# Patient Record
Sex: MALE | Race: WHITE | NOT HISPANIC OR LATINO | Employment: FULL TIME | URBAN - NONMETROPOLITAN AREA
[De-identification: names, ages, dates, MRNs, and addresses within clinical notes are randomized per-mention and may not be internally consistent; named-entity substitution may affect disease eponyms.]

---

## 2020-11-04 ENCOUNTER — HOSPITAL ENCOUNTER (INPATIENT)
Facility: HOSPITAL | Age: 40
LOS: 1 days | Discharge: ANOTHER HOSPITAL | End: 2020-11-05
Attending: INTERNAL MEDICINE | Admitting: INTERNAL MEDICINE

## 2020-11-04 DIAGNOSIS — I21.09 ACUTE ANTERIOR WALL MI (HCC): ICD-10-CM

## 2020-11-04 DIAGNOSIS — J96.01 ACUTE RESPIRATORY FAILURE WITH HYPOXIA (HCC): Primary | Chronic | ICD-10-CM

## 2020-11-04 DIAGNOSIS — R57.0 CARDIOGENIC SHOCK (HCC): ICD-10-CM

## 2020-11-04 PROCEDURE — C1894 INTRO/SHEATH, NON-LASER: HCPCS | Performed by: INTERNAL MEDICINE

## 2020-11-04 PROCEDURE — C1769 GUIDE WIRE: HCPCS | Performed by: INTERNAL MEDICINE

## 2020-11-04 PROCEDURE — 4A023N7 MEASUREMENT OF CARDIAC SAMPLING AND PRESSURE, LEFT HEART, PERCUTANEOUS APPROACH: ICD-10-PCS | Performed by: INTERNAL MEDICINE

## 2020-11-04 PROCEDURE — 0 IOPAMIDOL PER 1 ML: Performed by: INTERNAL MEDICINE

## 2020-11-04 PROCEDURE — 027135Z DILATION OF CORONARY ARTERY, TWO ARTERIES WITH TWO DRUG-ELUTING INTRALUMINAL DEVICES, PERCUTANEOUS APPROACH: ICD-10-PCS | Performed by: INTERNAL MEDICINE

## 2020-11-04 PROCEDURE — C1725 CATH, TRANSLUMIN NON-LASER: HCPCS | Performed by: INTERNAL MEDICINE

## 2020-11-04 PROCEDURE — 80053 COMPREHEN METABOLIC PANEL: CPT | Performed by: INTERNAL MEDICINE

## 2020-11-04 PROCEDURE — B41F1ZZ FLUOROSCOPY OF RIGHT LOWER EXTREMITY ARTERIES USING LOW OSMOLAR CONTRAST: ICD-10-PCS | Performed by: INTERNAL MEDICINE

## 2020-11-04 PROCEDURE — C1887 CATHETER, GUIDING: HCPCS | Performed by: INTERNAL MEDICINE

## 2020-11-04 PROCEDURE — 25010000002 PHENYLEPHRINE PER 1 ML: Performed by: INTERNAL MEDICINE

## 2020-11-04 PROCEDURE — 84484 ASSAY OF TROPONIN QUANT: CPT | Performed by: INTERNAL MEDICINE

## 2020-11-04 PROCEDURE — 02C03ZZ EXTIRPATION OF MATTER FROM CORONARY ARTERY, ONE ARTERY, PERCUTANEOUS APPROACH: ICD-10-PCS | Performed by: INTERNAL MEDICINE

## 2020-11-04 PROCEDURE — C9606 PERC D-E COR REVASC W AMI S: HCPCS | Performed by: INTERNAL MEDICINE

## 2020-11-04 PROCEDURE — 25010000002 EPTIFIBATIDE PER 5 MG: Performed by: INTERNAL MEDICINE

## 2020-11-04 PROCEDURE — C1874 STENT, COATED/COV W/DEL SYS: HCPCS | Performed by: INTERNAL MEDICINE

## 2020-11-04 PROCEDURE — C1757 CATH, THROMBECTOMY/EMBOLECT: HCPCS | Performed by: INTERNAL MEDICINE

## 2020-11-04 PROCEDURE — B2111ZZ FLUOROSCOPY OF MULTIPLE CORONARY ARTERIES USING LOW OSMOLAR CONTRAST: ICD-10-PCS | Performed by: INTERNAL MEDICINE

## 2020-11-04 PROCEDURE — 93454 CORONARY ARTERY ANGIO S&I: CPT | Performed by: INTERNAL MEDICINE

## 2020-11-04 PROCEDURE — 85025 COMPLETE CBC W/AUTO DIFF WBC: CPT | Performed by: INTERNAL MEDICINE

## 2020-11-04 PROCEDURE — 25010000002 BIVALIRUDIN TRIFLUOROACETATE 250 MG RECONSTITUTED SOLUTION 1 EACH VIAL: Performed by: INTERNAL MEDICINE

## 2020-11-04 DEVICE — XIENCE SIERRA™ EVEROLIMUS ELUTING CORONARY STENT SYSTEM 2.75 MM X 28 MM / RAPID-EXCHANGE
Type: IMPLANTABLE DEVICE | Site: CORONARY | Status: FUNCTIONAL
Brand: XIENCE SIERRA™

## 2020-11-04 DEVICE — XIENCE SIERRA™ EVEROLIMUS ELUTING CORONARY STENT SYSTEM 2.25 MM X 15 MM / RAPID-EXCHANGE
Type: IMPLANTABLE DEVICE | Site: CORONARY | Status: FUNCTIONAL
Brand: XIENCE SIERRA™

## 2020-11-04 RX ORDER — LIDOCAINE HYDROCHLORIDE 20 MG/ML
INJECTION, SOLUTION INFILTRATION; PERINEURAL AS NEEDED
Status: DISCONTINUED | OUTPATIENT
Start: 2020-11-04 | End: 2020-11-05 | Stop reason: HOSPADM

## 2020-11-05 ENCOUNTER — APPOINTMENT (OUTPATIENT)
Dept: ULTRASOUND IMAGING | Facility: HOSPITAL | Age: 40
End: 2020-11-05

## 2020-11-05 ENCOUNTER — ANESTHESIA (OUTPATIENT)
Dept: ICU | Facility: HOSPITAL | Age: 40
End: 2020-11-05

## 2020-11-05 ENCOUNTER — APPOINTMENT (OUTPATIENT)
Dept: GENERAL RADIOLOGY | Facility: HOSPITAL | Age: 40
End: 2020-11-05

## 2020-11-05 ENCOUNTER — APPOINTMENT (OUTPATIENT)
Dept: CARDIOLOGY | Facility: HOSPITAL | Age: 40
End: 2020-11-05

## 2020-11-05 ENCOUNTER — ANESTHESIA EVENT (OUTPATIENT)
Dept: ICU | Facility: HOSPITAL | Age: 40
End: 2020-11-05

## 2020-11-05 ENCOUNTER — APPOINTMENT (OUTPATIENT)
Dept: INTERVENTIONAL RADIOLOGY/VASCULAR | Facility: HOSPITAL | Age: 40
End: 2020-11-05

## 2020-11-05 VITALS
HEART RATE: 107 BPM | SYSTOLIC BLOOD PRESSURE: 121 MMHG | HEIGHT: 71 IN | BODY MASS INDEX: 27.19 KG/M2 | TEMPERATURE: 98 F | OXYGEN SATURATION: 100 % | WEIGHT: 194.2 LBS | DIASTOLIC BLOOD PRESSURE: 71 MMHG | RESPIRATION RATE: 26 BRPM

## 2020-11-05 PROBLEM — I25.10 CAD (CORONARY ARTERY DISEASE): Chronic | Status: ACTIVE | Noted: 2020-11-05

## 2020-11-05 PROBLEM — J96.01 ACUTE RESPIRATORY FAILURE WITH HYPOXIA (HCC): Chronic | Status: ACTIVE | Noted: 2020-11-05

## 2020-11-05 PROBLEM — I50.21 ACUTE SYSTOLIC HEART FAILURE (HCC): Status: ACTIVE | Noted: 2020-11-05

## 2020-11-05 PROBLEM — R57.0 SHOCK, CARDIOGENIC (HCC): Chronic | Status: ACTIVE | Noted: 2020-11-05

## 2020-11-05 PROBLEM — I50.811 ACUTE SYSTOLIC RIGHT HEART FAILURE (HCC): Status: ACTIVE | Noted: 2020-11-05

## 2020-11-05 PROBLEM — I21.9 ACUTE MYOCARDIAL INFARCTION (HCC): Status: ACTIVE | Noted: 2020-11-05

## 2020-11-05 LAB
ALBUMIN SERPL-MCNC: 3.2 G/DL (ref 3.5–5.2)
ALBUMIN SERPL-MCNC: 4.2 G/DL (ref 3.5–5.2)
ALBUMIN/GLOB SERPL: 1.4 G/DL
ALP SERPL-CCNC: 52 U/L (ref 39–117)
ALT SERPL W P-5'-P-CCNC: 54 U/L (ref 1–41)
ANION GAP SERPL CALCULATED.3IONS-SCNC: 10 MMOL/L (ref 5–15)
ANION GAP SERPL CALCULATED.3IONS-SCNC: 11 MMOL/L (ref 5–15)
APTT PPP: 121.6 SECONDS (ref 20–40.3)
ARTERIAL PATENCY WRIST A: ABNORMAL
AST SERPL-CCNC: 217 U/L (ref 1–40)
ATMOSPHERIC PRESS: 754 MMHG
ATMOSPHERIC PRESS: 756 MMHG
ATMOSPHERIC PRESS: ABNORMAL MM[HG]
BASE EXCESS BLDA CALC-SCNC: -1.4 MMOL/L (ref 0–2)
BASE EXCESS BLDA CALC-SCNC: -2.5 MMOL/L (ref 0–2)
BASE EXCESS BLDA CALC-SCNC: -4.3 MMOL/L (ref 0–2)
BASOPHILS # BLD AUTO: 0.03 10*3/MM3 (ref 0–0.2)
BASOPHILS # BLD AUTO: 0.05 10*3/MM3 (ref 0–0.2)
BASOPHILS # BLD AUTO: 0.06 10*3/MM3 (ref 0–0.2)
BASOPHILS NFR BLD AUTO: 0.2 % (ref 0–1.5)
BASOPHILS NFR BLD AUTO: 0.3 % (ref 0–1.5)
BASOPHILS NFR BLD AUTO: 0.4 % (ref 0–1.5)
BDY SITE: ABNORMAL
BH CV ECHO MEAS - AO MAX PG (FULL): 2.1 MMHG
BH CV ECHO MEAS - AO MAX PG: 7.6 MMHG
BH CV ECHO MEAS - AO MEAN PG (FULL): 2 MMHG
BH CV ECHO MEAS - AO MEAN PG: 5 MMHG
BH CV ECHO MEAS - AO V2 MAX: 138 CM/SEC
BH CV ECHO MEAS - AO V2 MEAN: 101 CM/SEC
BH CV ECHO MEAS - AO V2 VTI: 22.7 CM
BH CV ECHO MEAS - AVA(I,A): 2.7 CM^2
BH CV ECHO MEAS - AVA(I,D): 2.7 CM^2
BH CV ECHO MEAS - AVA(V,A): 3.2 CM^2
BH CV ECHO MEAS - AVA(V,D): 3.2 CM^2
BH CV ECHO MEAS - BSA(HAYCOCK): 2.1 M^2
BH CV ECHO MEAS - BSA: 2.1 M^2
BH CV ECHO MEAS - BZI_BMI: 27.1 KILOGRAMS/M^2
BH CV ECHO MEAS - BZI_METRIC_HEIGHT: 180.3 CM
BH CV ECHO MEAS - BZI_METRIC_WEIGHT: 88 KG
BH CV ECHO MEAS - EDV(CUBED): 117.6 ML
BH CV ECHO MEAS - EDV(MOD-SP2): 97.9 ML
BH CV ECHO MEAS - EDV(MOD-SP4): 80.5 ML
BH CV ECHO MEAS - EDV(TEICH): 112.8 ML
BH CV ECHO MEAS - EF(CUBED): 30.5 %
BH CV ECHO MEAS - EF(MOD-SP2): 31 %
BH CV ECHO MEAS - EF(MOD-SP4): 20 %
BH CV ECHO MEAS - EF(TEICH): 24.7 %
BH CV ECHO MEAS - ESV(CUBED): 81.7 ML
BH CV ECHO MEAS - ESV(MOD-SP2): 67.6 ML
BH CV ECHO MEAS - ESV(MOD-SP4): 64.4 ML
BH CV ECHO MEAS - ESV(TEICH): 84.9 ML
BH CV ECHO MEAS - FS: 11.4 %
BH CV ECHO MEAS - IVS/LVPW: 0.64
BH CV ECHO MEAS - IVSD: 0.73 CM
BH CV ECHO MEAS - LA DIMENSION: 3.8 CM
BH CV ECHO MEAS - LV DIASTOLIC VOL/BSA (35-75): 38.7 ML/M^2
BH CV ECHO MEAS - LV MASS(C)D: 160.9 GRAMS
BH CV ECHO MEAS - LV MASS(C)DI: 77.3 GRAMS/M^2
BH CV ECHO MEAS - LV MAX PG: 5.5 MMHG
BH CV ECHO MEAS - LV MEAN PG: 3 MMHG
BH CV ECHO MEAS - LV SYSTOLIC VOL/BSA (12-30): 30.9 ML/M^2
BH CV ECHO MEAS - LV V1 MAX: 117 CM/SEC
BH CV ECHO MEAS - LV V1 MEAN: 81.9 CM/SEC
BH CV ECHO MEAS - LV V1 VTI: 16.4 CM
BH CV ECHO MEAS - LVIDD: 4.9 CM
BH CV ECHO MEAS - LVIDS: 4.3 CM
BH CV ECHO MEAS - LVLD AP2: 9.1 CM
BH CV ECHO MEAS - LVLD AP4: 8.4 CM
BH CV ECHO MEAS - LVLS AP2: 8.4 CM
BH CV ECHO MEAS - LVLS AP4: 7.8 CM
BH CV ECHO MEAS - LVOT AREA (M): 3.8 CM^2
BH CV ECHO MEAS - LVOT AREA: 3.8 CM^2
BH CV ECHO MEAS - LVOT DIAM: 2.2 CM
BH CV ECHO MEAS - LVPWD: 1.1 CM
BH CV ECHO MEAS - MR MAX PG: 50.1 MMHG
BH CV ECHO MEAS - MR MAX VEL: 354 CM/SEC
BH CV ECHO MEAS - MV DEC SLOPE: 599 CM/SEC^2
BH CV ECHO MEAS - MV E MAX VEL: 116 CM/SEC
BH CV ECHO MEAS - MV P1/2T MAX VEL: 141 CM/SEC
BH CV ECHO MEAS - MV P1/2T: 68.9 MSEC
BH CV ECHO MEAS - MVA P1/2T LCG: 1.6 CM^2
BH CV ECHO MEAS - MVA(P1/2T): 3.2 CM^2
BH CV ECHO MEAS - PA MAX PG (FULL): 2.6 MMHG
BH CV ECHO MEAS - PA MAX PG: 7.7 MMHG
BH CV ECHO MEAS - PA V2 MAX: 139 CM/SEC
BH CV ECHO MEAS - RAP SYSTOLE: 5 MMHG
BH CV ECHO MEAS - RV MAX PG: 5.1 MMHG
BH CV ECHO MEAS - RV MEAN PG: 2 MMHG
BH CV ECHO MEAS - RV V1 MAX: 113 CM/SEC
BH CV ECHO MEAS - RV V1 MEAN: 68.4 CM/SEC
BH CV ECHO MEAS - RV V1 VTI: 14.8 CM
BH CV ECHO MEAS - RVSP: 47.5 MMHG
BH CV ECHO MEAS - SI(CUBED): 17.2 ML/M^2
BH CV ECHO MEAS - SI(LVOT): 29.9 ML/M^2
BH CV ECHO MEAS - SI(MOD-SP2): 14.6 ML/M^2
BH CV ECHO MEAS - SI(MOD-SP4): 7.7 ML/M^2
BH CV ECHO MEAS - SI(TEICH): 13.4 ML/M^2
BH CV ECHO MEAS - SV(CUBED): 35.9 ML
BH CV ECHO MEAS - SV(LVOT): 62.3 ML
BH CV ECHO MEAS - SV(MOD-SP2): 30.3 ML
BH CV ECHO MEAS - SV(MOD-SP4): 16.1 ML
BH CV ECHO MEAS - SV(TEICH): 27.9 ML
BH CV ECHO MEAS - TR MAX VEL: 326 CM/SEC
BILIRUB SERPL-MCNC: 0.6 MG/DL (ref 0–1.2)
BUN SERPL-MCNC: 10 MG/DL (ref 6–20)
BUN SERPL-MCNC: 13 MG/DL (ref 6–20)
BUN/CREAT SERPL: 11.8 (ref 7–25)
BUN/CREAT SERPL: 12.2 (ref 7–25)
CALCIUM SPEC-SCNC: 8 MG/DL (ref 8.6–10.5)
CALCIUM SPEC-SCNC: 9.1 MG/DL (ref 8.6–10.5)
CHLORIDE SERPL-SCNC: 102 MMOL/L (ref 98–107)
CHLORIDE SERPL-SCNC: 97 MMOL/L (ref 98–107)
CHOLEST SERPL-MCNC: 201 MG/DL (ref 0–200)
CO2 SERPL-SCNC: 20 MMOL/L (ref 22–29)
CO2 SERPL-SCNC: 25 MMOL/L (ref 22–29)
CREAT SERPL-MCNC: 0.82 MG/DL (ref 0.76–1.27)
CREAT SERPL-MCNC: 1.1 MG/DL (ref 0.76–1.27)
D-LACTATE SERPL-SCNC: 1.9 MMOL/L (ref 0.5–2)
DEPRECATED RDW RBC AUTO: 43.4 FL (ref 37–54)
DEPRECATED RDW RBC AUTO: 45.8 FL (ref 37–54)
DEPRECATED RDW RBC AUTO: 47.1 FL (ref 37–54)
EOSINOPHIL # BLD AUTO: 0 10*3/MM3 (ref 0–0.4)
EOSINOPHIL # BLD AUTO: 0 10*3/MM3 (ref 0–0.4)
EOSINOPHIL # BLD AUTO: 0.03 10*3/MM3 (ref 0–0.4)
EOSINOPHIL NFR BLD AUTO: 0 % (ref 0.3–6.2)
EOSINOPHIL NFR BLD AUTO: 0 % (ref 0.3–6.2)
EOSINOPHIL NFR BLD AUTO: 0.2 % (ref 0.3–6.2)
ERYTHROCYTE [DISTWIDTH] IN BLOOD BY AUTOMATED COUNT: 12.7 % (ref 12.3–15.4)
ERYTHROCYTE [DISTWIDTH] IN BLOOD BY AUTOMATED COUNT: 13 % (ref 12.3–15.4)
ERYTHROCYTE [DISTWIDTH] IN BLOOD BY AUTOMATED COUNT: 13.2 % (ref 12.3–15.4)
GAS FLOW AIRWAY: 64 LPM
GFR SERPL CREATININE-BSD FRML MDRD: 103 ML/MIN/1.73
GFR SERPL CREATININE-BSD FRML MDRD: 73 ML/MIN/1.73
GLOBULIN UR ELPH-MCNC: 2.3 GM/DL
GLUCOSE SERPL-MCNC: 121 MG/DL (ref 65–99)
GLUCOSE SERPL-MCNC: 137 MG/DL (ref 65–99)
HBA1C MFR BLD: 5.2 % (ref 4.8–5.6)
HCO3 BLDA-SCNC: 20.1 MMOL/L (ref 20–26)
HCO3 BLDA-SCNC: 22.3 MMOL/L (ref 20–26)
HCO3 BLDA-SCNC: 22.9 MMOL/L (ref 20–26)
HCT VFR BLD AUTO: 31.7 % (ref 37.5–51)
HCT VFR BLD AUTO: 40.4 % (ref 37.5–51)
HCT VFR BLD AUTO: 44.1 % (ref 37.5–51)
HDLC SERPL-MCNC: 35 MG/DL (ref 40–60)
HGB BLD-MCNC: 10.8 G/DL (ref 13–17.7)
HGB BLD-MCNC: 14.1 G/DL (ref 13–17.7)
HGB BLD-MCNC: 14.8 G/DL (ref 13–17.7)
HOLD SPECIMEN: NORMAL
IMM GRANULOCYTES # BLD AUTO: 0.06 10*3/MM3 (ref 0–0.05)
IMM GRANULOCYTES # BLD AUTO: 0.1 10*3/MM3 (ref 0–0.05)
IMM GRANULOCYTES # BLD AUTO: 0.11 10*3/MM3 (ref 0–0.05)
IMM GRANULOCYTES NFR BLD AUTO: 0.4 % (ref 0–0.5)
IMM GRANULOCYTES NFR BLD AUTO: 0.6 % (ref 0–0.5)
IMM GRANULOCYTES NFR BLD AUTO: 0.6 % (ref 0–0.5)
INHALED O2 CONCENTRATION: 100 %
INR PPP: 1.22 (ref 0.8–1.2)
LDLC SERPL CALC-MCNC: 147 MG/DL (ref 0–100)
LDLC/HDLC SERPL: 4.16 {RATIO}
LYMPHOCYTES # BLD AUTO: 1.25 10*3/MM3 (ref 0.7–3.1)
LYMPHOCYTES # BLD AUTO: 1.38 10*3/MM3 (ref 0.7–3.1)
LYMPHOCYTES # BLD AUTO: 2.23 10*3/MM3 (ref 0.7–3.1)
LYMPHOCYTES NFR BLD AUTO: 14.1 % (ref 19.6–45.3)
LYMPHOCYTES NFR BLD AUTO: 6.9 % (ref 19.6–45.3)
LYMPHOCYTES NFR BLD AUTO: 7.5 % (ref 19.6–45.3)
Lab: ABNORMAL
Lab: ABNORMAL
MAXIMAL PREDICTED HEART RATE: 178 BPM
MAXIMAL PREDICTED HEART RATE: 178 BPM
MCH RBC QN AUTO: 32.3 PG (ref 26.6–33)
MCH RBC QN AUTO: 32.5 PG (ref 26.6–33)
MCH RBC QN AUTO: 32.9 PG (ref 26.6–33)
MCHC RBC AUTO-ENTMCNC: 33.6 G/DL (ref 31.5–35.7)
MCHC RBC AUTO-ENTMCNC: 34.1 G/DL (ref 31.5–35.7)
MCHC RBC AUTO-ENTMCNC: 34.9 G/DL (ref 31.5–35.7)
MCV RBC AUTO: 93.1 FL (ref 79–97)
MCV RBC AUTO: 96.3 FL (ref 79–97)
MCV RBC AUTO: 96.6 FL (ref 79–97)
MODALITY: ABNORMAL
MONOCYTES # BLD AUTO: 1.77 10*3/MM3 (ref 0.1–0.9)
MONOCYTES # BLD AUTO: 1.9 10*3/MM3 (ref 0.1–0.9)
MONOCYTES # BLD AUTO: 2.08 10*3/MM3 (ref 0.1–0.9)
MONOCYTES NFR BLD AUTO: 10.5 % (ref 5–12)
MONOCYTES NFR BLD AUTO: 11.2 % (ref 5–12)
MONOCYTES NFR BLD AUTO: 11.3 % (ref 5–12)
NEUTROPHILS NFR BLD AUTO: 11.63 10*3/MM3 (ref 1.7–7)
NEUTROPHILS NFR BLD AUTO: 14.75 10*3/MM3 (ref 1.7–7)
NEUTROPHILS NFR BLD AUTO: 14.77 10*3/MM3 (ref 1.7–7)
NEUTROPHILS NFR BLD AUTO: 73.7 % (ref 42.7–76)
NEUTROPHILS NFR BLD AUTO: 80.3 % (ref 42.7–76)
NEUTROPHILS NFR BLD AUTO: 81.8 % (ref 42.7–76)
NRBC BLD AUTO-RTO: 0 /100 WBC (ref 0–0.2)
OXYGEN SATURATION, PULMONARY ARTERY: 68.5 %
OXYGEN SATURATION, PULMONARY ARTERY: 69.8 %
PAW @ PEAK INSP FLOW SETTING VENT: 26 CMH2O
PCO2 BLDA: 33.9 MM HG (ref 35–45)
PCO2 BLDA: 34.5 MM HG (ref 35–45)
PCO2 BLDA: 41 MM HG (ref 35–45)
PEEP RESPIRATORY: 5 CM[H2O]
PH BLDA: 7.36 PH UNITS (ref 7.35–7.45)
PH BLDA: 7.37 PH UNITS (ref 7.35–7.45)
PH BLDA: 7.42 PH UNITS (ref 7.35–7.45)
PHOSPHATE SERPL-MCNC: 2.6 MG/DL (ref 2.5–4.5)
PLATELET # BLD AUTO: 157 10*3/MM3 (ref 140–450)
PLATELET # BLD AUTO: 222 10*3/MM3 (ref 140–450)
PLATELET # BLD AUTO: 231 10*3/MM3 (ref 140–450)
PMV BLD AUTO: 10.9 FL (ref 6–12)
PMV BLD AUTO: 11.1 FL (ref 6–12)
PMV BLD AUTO: 11.2 FL (ref 6–12)
PO2 BLDA: 107 MM HG (ref 83–108)
PO2 BLDA: 475 MM HG (ref 83–108)
PO2 BLDA: 55.5 MM HG (ref 83–108)
POTASSIUM SERPL-SCNC: 3.5 MMOL/L (ref 3.5–5.2)
POTASSIUM SERPL-SCNC: 4.5 MMOL/L (ref 3.5–5.2)
PROT SERPL-MCNC: 5.5 G/DL (ref 6–8.5)
PROTHROMBIN TIME: 16 SECONDS (ref 11.1–15.3)
RBC # BLD AUTO: 3.28 10*6/MM3 (ref 4.14–5.8)
RBC # BLD AUTO: 4.34 10*6/MM3 (ref 4.14–5.8)
RBC # BLD AUTO: 4.58 10*6/MM3 (ref 4.14–5.8)
SAO2 % BLDCOA: 90.5 % (ref 94–99)
SAO2 % BLDCOA: 98.4 % (ref 94–99)
SAO2 % BLDCOA: >100 % (ref 94–99)
SARS-COV-2 N GENE RESP QL NAA+PROBE: NOT DETECTED
SET MECH RESP RATE: 18
SODIUM SERPL-SCNC: 128 MMOL/L (ref 136–145)
SODIUM SERPL-SCNC: 137 MMOL/L (ref 136–145)
STRESS TARGET HR: 151 BPM
STRESS TARGET HR: 151 BPM
TRIGL SERPL-MCNC: 102 MG/DL (ref 0–150)
TROPONIN T SERPL-MCNC: 2.09 NG/ML (ref 0–0.03)
TROPONIN T SERPL-MCNC: 8.08 NG/ML (ref 0–0.03)
TROPONIN T SERPL-MCNC: 9.03 NG/ML (ref 0–0.03)
VENTILATOR MODE: ABNORMAL
VENTILATOR MODE: ABNORMAL
VLDLC SERPL-MCNC: 19 MG/DL (ref 5–40)
VT ON VENT VENT: 500 ML
WBC # BLD AUTO: 15.78 10*3/MM3 (ref 3.4–10.8)
WBC # BLD AUTO: 18.03 10*3/MM3 (ref 3.4–10.8)
WBC # BLD AUTO: 18.39 10*3/MM3 (ref 3.4–10.8)
WHOLE BLOOD HOLD SPECIMEN: NORMAL

## 2020-11-05 PROCEDURE — 84484 ASSAY OF TROPONIN QUANT: CPT | Performed by: INTERNAL MEDICINE

## 2020-11-05 PROCEDURE — 93005 ELECTROCARDIOGRAM TRACING: CPT | Performed by: INTERNAL MEDICINE

## 2020-11-05 PROCEDURE — 25010000002 FUROSEMIDE PER 20 MG

## 2020-11-05 PROCEDURE — 94760 N-INVAS EAR/PLS OXIMETRY 1: CPT

## 2020-11-05 PROCEDURE — 25010000002 PROPOFOL 10 MG/ML EMULSION

## 2020-11-05 PROCEDURE — 93308 TTE F-UP OR LMTD: CPT

## 2020-11-05 PROCEDURE — 94799 UNLISTED PULMONARY SVC/PX: CPT

## 2020-11-05 PROCEDURE — 02HA3RZ INSERTION OF SHORT-TERM EXTERNAL HEART ASSIST SYSTEM INTO HEART, PERCUTANEOUS APPROACH: ICD-10-PCS | Performed by: INTERNAL MEDICINE

## 2020-11-05 PROCEDURE — 25010000002 MIDAZOLAM PER 1 MG

## 2020-11-05 PROCEDURE — 06HY33Z INSERTION OF INFUSION DEVICE INTO LOWER VEIN, PERCUTANEOUS APPROACH: ICD-10-PCS | Performed by: INTERNAL MEDICINE

## 2020-11-05 PROCEDURE — 5A02210 ASSISTANCE WITH CARDIAC OUTPUT USING BALLOON PUMP, CONTINUOUS: ICD-10-PCS | Performed by: INTERNAL MEDICINE

## 2020-11-05 PROCEDURE — C1894 INTRO/SHEATH, NON-LASER: HCPCS | Performed by: INTERNAL MEDICINE

## 2020-11-05 PROCEDURE — 25010000002 MIDAZOLAM 50 MG/10ML SOLUTION: Performed by: INTERNAL MEDICINE

## 2020-11-05 PROCEDURE — C1769 GUIDE WIRE: HCPCS | Performed by: INTERNAL MEDICINE

## 2020-11-05 PROCEDURE — 25010000002 ONDANSETRON PER 1 MG: Performed by: INTERNAL MEDICINE

## 2020-11-05 PROCEDURE — 80069 RENAL FUNCTION PANEL: CPT | Performed by: INTERNAL MEDICINE

## 2020-11-05 PROCEDURE — 02HP32Z INSERTION OF MONITORING DEVICE INTO PULMONARY TRUNK, PERCUTANEOUS APPROACH: ICD-10-PCS | Performed by: INTERNAL MEDICINE

## 2020-11-05 PROCEDURE — 25010000002 DOPAMINE PER 40 MG

## 2020-11-05 PROCEDURE — 36600 WITHDRAWAL OF ARTERIAL BLOOD: CPT | Performed by: INTERNAL MEDICINE

## 2020-11-05 PROCEDURE — 82810 BLOOD GASES O2 SAT ONLY: CPT | Performed by: INTERNAL MEDICINE

## 2020-11-05 PROCEDURE — 31500 INSERT EMERGENCY AIRWAY: CPT | Performed by: ANESTHESIOLOGY

## 2020-11-05 PROCEDURE — 71045 X-RAY EXAM CHEST 1 VIEW: CPT

## 2020-11-05 PROCEDURE — 93306 TTE W/DOPPLER COMPLETE: CPT

## 2020-11-05 PROCEDURE — 25010000002 PERFLUTREN (DEFINITY) 8.476 MG IN SODIUM CHLORIDE 0.9 % 10 ML INJECTION: Performed by: INTERNAL MEDICINE

## 2020-11-05 PROCEDURE — 25010000002 HEPARIN (PORCINE) PER 1000 UNITS: Performed by: INTERNAL MEDICINE

## 2020-11-05 PROCEDURE — 0 IOPAMIDOL PER 1 ML: Performed by: INTERNAL MEDICINE

## 2020-11-05 PROCEDURE — 87635 SARS-COV-2 COVID-19 AMP PRB: CPT | Performed by: INTERNAL MEDICINE

## 2020-11-05 PROCEDURE — 85610 PROTHROMBIN TIME: CPT | Performed by: INTERNAL MEDICINE

## 2020-11-05 PROCEDURE — 93010 ELECTROCARDIOGRAM REPORT: CPT | Performed by: INTERNAL MEDICINE

## 2020-11-05 PROCEDURE — 85730 THROMBOPLASTIN TIME PARTIAL: CPT | Performed by: INTERNAL MEDICINE

## 2020-11-05 PROCEDURE — 25010000003 MILRINONE LACTATE IN DEXTROSE 20-5 MG/100ML-% SOLUTION: Performed by: INTERNAL MEDICINE

## 2020-11-05 PROCEDURE — 5A0221D ASSISTANCE WITH CARDIAC OUTPUT USING IMPELLER PUMP, CONTINUOUS: ICD-10-PCS | Performed by: INTERNAL MEDICINE

## 2020-11-05 PROCEDURE — 85025 COMPLETE CBC W/AUTO DIFF WBC: CPT | Performed by: INTERNAL MEDICINE

## 2020-11-05 PROCEDURE — 0BH17EZ INSERTION OF ENDOTRACHEAL AIRWAY INTO TRACHEA, VIA NATURAL OR ARTIFICIAL OPENING: ICD-10-PCS | Performed by: INTERNAL MEDICINE

## 2020-11-05 PROCEDURE — C1751 CATH, INF, PER/CENT/MIDLINE: HCPCS | Performed by: INTERNAL MEDICINE

## 2020-11-05 PROCEDURE — 82803 BLOOD GASES ANY COMBINATION: CPT

## 2020-11-05 PROCEDURE — 25010000002 PHENYLEPHRINE 10 MG/ML SOLUTION: Performed by: INTERNAL MEDICINE

## 2020-11-05 PROCEDURE — 99255 IP/OBS CONSLTJ NEW/EST HI 80: CPT | Performed by: NURSE PRACTITIONER

## 2020-11-05 PROCEDURE — 83605 ASSAY OF LACTIC ACID: CPT | Performed by: INTERNAL MEDICINE

## 2020-11-05 PROCEDURE — 25010000002 PROPOFOL 10 MG/ML EMULSION: Performed by: INTERNAL MEDICINE

## 2020-11-05 PROCEDURE — 93454 CORONARY ARTERY ANGIO S&I: CPT | Performed by: INTERNAL MEDICINE

## 2020-11-05 PROCEDURE — 5A1935Z RESPIRATORY VENTILATION, LESS THAN 24 CONSECUTIVE HOURS: ICD-10-PCS | Performed by: INTERNAL MEDICINE

## 2020-11-05 PROCEDURE — C1751 CATH, INF, PER/CENT/MIDLINE: HCPCS

## 2020-11-05 PROCEDURE — 5A1223Z PERFORMANCE OF CARDIAC PACING, CONTINUOUS: ICD-10-PCS | Performed by: INTERNAL MEDICINE

## 2020-11-05 PROCEDURE — 4A023N7 MEASUREMENT OF CARDIAC SAMPLING AND PRESSURE, LEFT HEART, PERCUTANEOUS APPROACH: ICD-10-PCS | Performed by: INTERNAL MEDICINE

## 2020-11-05 PROCEDURE — 25010000002 ALBUMIN HUMAN 25% PER 50 ML: Performed by: INTERNAL MEDICINE

## 2020-11-05 PROCEDURE — 02HV33Z INSERTION OF INFUSION DEVICE INTO SUPERIOR VENA CAVA, PERCUTANEOUS APPROACH: ICD-10-PCS | Performed by: INTERNAL MEDICINE

## 2020-11-05 PROCEDURE — 25010000002 DOBUTAMINE PER 250 MG: Performed by: INTERNAL MEDICINE

## 2020-11-05 PROCEDURE — 80061 LIPID PANEL: CPT | Performed by: INTERNAL MEDICINE

## 2020-11-05 PROCEDURE — 93503 INSERT/PLACE HEART CATHETER: CPT | Performed by: INTERNAL MEDICINE

## 2020-11-05 PROCEDURE — 82803 BLOOD GASES ANY COMBINATION: CPT | Performed by: INTERNAL MEDICINE

## 2020-11-05 PROCEDURE — 33967 INSERT I-AORT PERCUT DEVICE: CPT | Performed by: INTERNAL MEDICINE

## 2020-11-05 PROCEDURE — P9047 ALBUMIN (HUMAN), 25%, 50ML: HCPCS | Performed by: INTERNAL MEDICINE

## 2020-11-05 PROCEDURE — 94002 VENT MGMT INPAT INIT DAY: CPT

## 2020-11-05 PROCEDURE — B2111ZZ FLUOROSCOPY OF MULTIPLE CORONARY ARTERIES USING LOW OSMOLAR CONTRAST: ICD-10-PCS | Performed by: INTERNAL MEDICINE

## 2020-11-05 PROCEDURE — 83036 HEMOGLOBIN GLYCOSYLATED A1C: CPT | Performed by: INTERNAL MEDICINE

## 2020-11-05 RX ORDER — DOBUTAMINE HYDROCHLORIDE 100 MG/100ML
2-20 INJECTION INTRAVENOUS
Status: DISCONTINUED | OUTPATIENT
Start: 2020-11-05 | End: 2020-11-05 | Stop reason: HOSPADM

## 2020-11-05 RX ORDER — ATORVASTATIN CALCIUM 40 MG/1
80 TABLET, FILM COATED ORAL NIGHTLY
Status: DISCONTINUED | OUTPATIENT
Start: 2020-11-05 | End: 2020-11-05 | Stop reason: SDUPTHER

## 2020-11-05 RX ORDER — ONDANSETRON 2 MG/ML
INJECTION INTRAMUSCULAR; INTRAVENOUS
Status: DISPENSED
Start: 2020-11-05 | End: 2020-11-05

## 2020-11-05 RX ORDER — MIDAZOLAM HYDROCHLORIDE 1 MG/ML
4 INJECTION INTRAMUSCULAR; INTRAVENOUS ONCE
Status: DISCONTINUED | OUTPATIENT
Start: 2020-11-05 | End: 2020-11-05 | Stop reason: HOSPADM

## 2020-11-05 RX ORDER — CARVEDILOL 3.12 MG/1
3.12 TABLET ORAL EVERY 12 HOURS SCHEDULED
Status: DISCONTINUED | OUTPATIENT
Start: 2020-11-05 | End: 2020-11-05 | Stop reason: HOSPADM

## 2020-11-05 RX ORDER — PHENYLEPHRINE HCL IN 0.9% NACL 0.5 MG/5ML
.5-3 SYRINGE (ML) INTRAVENOUS
Status: DISCONTINUED | OUTPATIENT
Start: 2020-11-05 | End: 2020-11-05 | Stop reason: HOSPADM

## 2020-11-05 RX ORDER — ALBUMIN (HUMAN) 12.5 G/50ML
25 SOLUTION INTRAVENOUS ONCE
Status: COMPLETED | OUTPATIENT
Start: 2020-11-05 | End: 2020-11-05

## 2020-11-05 RX ORDER — ETOMIDATE 2 MG/ML
20 INJECTION INTRAVENOUS ONCE
Status: DISCONTINUED | OUTPATIENT
Start: 2020-11-05 | End: 2020-11-05 | Stop reason: HOSPADM

## 2020-11-05 RX ORDER — HEPARIN SODIUM 10000 [USP'U]/100ML
10 INJECTION, SOLUTION INTRAVENOUS
Start: 2020-11-05 | End: 2022-05-19

## 2020-11-05 RX ORDER — SODIUM CHLORIDE 9 MG/ML
100 INJECTION, SOLUTION INTRAVENOUS CONTINUOUS
Status: DISCONTINUED | OUTPATIENT
Start: 2020-11-05 | End: 2020-11-05 | Stop reason: HOSPADM

## 2020-11-05 RX ORDER — SODIUM CHLORIDE 9 MG/ML
INJECTION, SOLUTION INTRAVENOUS
Status: DISCONTINUED
Start: 2020-11-05 | End: 2020-11-05 | Stop reason: HOSPADM

## 2020-11-05 RX ORDER — HEPARIN SODIUM 10000 [USP'U]/100ML
10 INJECTION, SOLUTION INTRAVENOUS
Status: DISCONTINUED | OUTPATIENT
Start: 2020-11-05 | End: 2020-11-05 | Stop reason: HOSPADM

## 2020-11-05 RX ORDER — DOPAMINE HYDROCHLORIDE 160 MG/100ML
INJECTION, SOLUTION INTRAVENOUS
Status: COMPLETED
Start: 2020-11-05 | End: 2020-11-05

## 2020-11-05 RX ORDER — ACETAMINOPHEN 325 MG/1
650 TABLET ORAL EVERY 4 HOURS PRN
Status: DISCONTINUED | OUTPATIENT
Start: 2020-11-05 | End: 2020-11-05 | Stop reason: HOSPADM

## 2020-11-05 RX ORDER — MIDAZOLAM HYDROCHLORIDE 1 MG/ML
INJECTION INTRAMUSCULAR; INTRAVENOUS
Status: COMPLETED
Start: 2020-11-05 | End: 2020-11-05

## 2020-11-05 RX ORDER — OXYCODONE HYDROCHLORIDE AND ACETAMINOPHEN 5; 325 MG/1; MG/1
1 TABLET ORAL EVERY 4 HOURS PRN
Status: DISCONTINUED | OUTPATIENT
Start: 2020-11-05 | End: 2020-11-05 | Stop reason: HOSPADM

## 2020-11-05 RX ORDER — ONDANSETRON 2 MG/ML
4 INJECTION INTRAMUSCULAR; INTRAVENOUS EVERY 6 HOURS PRN
Start: 2020-11-05 | End: 2022-05-19

## 2020-11-05 RX ORDER — DOPAMINE HYDROCHLORIDE 160 MG/100ML
5 INJECTION, SOLUTION INTRAVENOUS
Start: 2020-11-05 | End: 2022-05-19

## 2020-11-05 RX ORDER — ACETAMINOPHEN 325 MG/1
650 TABLET ORAL EVERY 4 HOURS PRN
Start: 2020-11-05 | End: 2022-05-19

## 2020-11-05 RX ORDER — LOSARTAN POTASSIUM 25 MG/1
12.5 TABLET ORAL DAILY
Start: 2020-11-06 | End: 2022-05-19

## 2020-11-05 RX ORDER — DOBUTAMINE HYDROCHLORIDE 100 MG/100ML
2-20 INJECTION INTRAVENOUS
Start: 2020-11-05 | End: 2022-05-19

## 2020-11-05 RX ORDER — CARVEDILOL 6.25 MG/1
6.25 TABLET ORAL 2 TIMES DAILY WITH MEALS
Status: DISCONTINUED | OUTPATIENT
Start: 2020-11-05 | End: 2020-11-05 | Stop reason: SDUPTHER

## 2020-11-05 RX ORDER — SODIUM CHLORIDE 9 MG/ML
INJECTION, SOLUTION INTRAVENOUS
Status: COMPLETED
Start: 2020-11-05 | End: 2020-11-05

## 2020-11-05 RX ORDER — SUCCINYLCHOLINE CHLORIDE 20 MG/ML
100 INJECTION INTRAMUSCULAR; INTRAVENOUS ONCE
Status: DISCONTINUED | OUTPATIENT
Start: 2020-11-05 | End: 2020-11-05 | Stop reason: HOSPADM

## 2020-11-05 RX ORDER — SODIUM CHLORIDE 9 MG/ML
100 INJECTION, SOLUTION INTRAVENOUS CONTINUOUS
Start: 2020-11-05 | End: 2022-05-19

## 2020-11-05 RX ORDER — MIDAZOLAM HYDROCHLORIDE 1 MG/ML
2 INJECTION INTRAMUSCULAR; INTRAVENOUS ONCE
Status: DISCONTINUED | OUTPATIENT
Start: 2020-11-05 | End: 2020-11-05 | Stop reason: HOSPADM

## 2020-11-05 RX ORDER — PANTOPRAZOLE SODIUM 40 MG/10ML
40 INJECTION, POWDER, LYOPHILIZED, FOR SOLUTION INTRAVENOUS
Start: 2020-11-05 | End: 2022-05-19

## 2020-11-05 RX ORDER — CARVEDILOL 3.12 MG/1
3.12 TABLET ORAL EVERY 12 HOURS SCHEDULED
Start: 2020-11-05 | End: 2022-05-19

## 2020-11-05 RX ORDER — ATORVASTATIN CALCIUM 10 MG/1
40 TABLET, FILM COATED ORAL NIGHTLY
Start: 2020-11-05 | End: 2022-05-19

## 2020-11-05 RX ORDER — ACETAMINOPHEN 325 MG/1
650 TABLET ORAL EVERY 4 HOURS PRN
Status: DISCONTINUED | OUTPATIENT
Start: 2020-11-05 | End: 2020-11-05 | Stop reason: SDUPTHER

## 2020-11-05 RX ORDER — LIDOCAINE HYDROCHLORIDE 20 MG/ML
INJECTION, SOLUTION INFILTRATION; PERINEURAL AS NEEDED
Status: DISCONTINUED | OUTPATIENT
Start: 2020-11-05 | End: 2020-11-05 | Stop reason: HOSPADM

## 2020-11-05 RX ORDER — MILRINONE LACTATE 0.2 MG/ML
.25-.75 INJECTION, SOLUTION INTRAVENOUS
Status: DISCONTINUED | OUTPATIENT
Start: 2020-11-05 | End: 2020-11-05 | Stop reason: HOSPADM

## 2020-11-05 RX ORDER — FUROSEMIDE 10 MG/ML
INJECTION INTRAMUSCULAR; INTRAVENOUS
Status: COMPLETED
Start: 2020-11-05 | End: 2020-11-05

## 2020-11-05 RX ORDER — POTASSIUM CHLORIDE 750 MG/1
40 CAPSULE, EXTENDED RELEASE ORAL ONCE
Status: COMPLETED | OUTPATIENT
Start: 2020-11-05 | End: 2020-11-05

## 2020-11-05 RX ORDER — EPTIFIBATIDE 0.75 MG/ML
INJECTION, SOLUTION INTRAVENOUS CONTINUOUS PRN
Status: COMPLETED | OUTPATIENT
Start: 2020-11-04 | End: 2020-11-04

## 2020-11-05 RX ORDER — PANTOPRAZOLE SODIUM 40 MG/10ML
40 INJECTION, POWDER, LYOPHILIZED, FOR SOLUTION INTRAVENOUS
Status: DISCONTINUED | OUTPATIENT
Start: 2020-11-05 | End: 2020-11-05 | Stop reason: HOSPADM

## 2020-11-05 RX ORDER — ONDANSETRON 2 MG/ML
4 INJECTION INTRAMUSCULAR; INTRAVENOUS EVERY 6 HOURS PRN
Status: DISCONTINUED | OUTPATIENT
Start: 2020-11-05 | End: 2020-11-05 | Stop reason: HOSPADM

## 2020-11-05 RX ORDER — HEPARIN SODIUM 1000 [USP'U]/ML
INJECTION, SOLUTION INTRAVENOUS; SUBCUTANEOUS AS NEEDED
Status: DISCONTINUED | OUTPATIENT
Start: 2020-11-05 | End: 2020-11-05 | Stop reason: HOSPADM

## 2020-11-05 RX ORDER — DOPAMINE HYDROCHLORIDE 160 MG/100ML
5 INJECTION, SOLUTION INTRAVENOUS
Status: DISCONTINUED | OUTPATIENT
Start: 2020-11-05 | End: 2020-11-05 | Stop reason: HOSPADM

## 2020-11-05 RX ORDER — PHENYLEPHRINE HCL IN 0.9% NACL 0.5 MG/5ML
.5-3 SYRINGE (ML) INTRAVENOUS
Start: 2020-11-05 | End: 2022-05-19

## 2020-11-05 RX ORDER — SODIUM CHLORIDE 9 MG/ML
INJECTION, SOLUTION INTRAVENOUS
Status: DISPENSED
Start: 2020-11-05 | End: 2020-11-05

## 2020-11-05 RX ORDER — PROPOFOL 10 MG/ML
VIAL (ML) INTRAVENOUS
Status: COMPLETED
Start: 2020-11-05 | End: 2020-11-05

## 2020-11-05 RX ORDER — ATORVASTATIN CALCIUM 40 MG/1
40 TABLET, FILM COATED ORAL NIGHTLY
Status: DISCONTINUED | OUTPATIENT
Start: 2020-11-05 | End: 2020-11-05 | Stop reason: HOSPADM

## 2020-11-05 RX ORDER — MILRINONE LACTATE 0.2 MG/ML
.25-.75 INJECTION, SOLUTION INTRAVENOUS
Start: 2020-11-05 | End: 2022-05-19

## 2020-11-05 RX ORDER — ACETAMINOPHEN 325 MG/1
650 TABLET ORAL EVERY 4 HOURS PRN
Status: DISCONTINUED | OUTPATIENT
Start: 2020-11-05 | End: 2020-11-05

## 2020-11-05 RX ORDER — LOSARTAN POTASSIUM 25 MG/1
12.5 TABLET ORAL DAILY
Status: DISCONTINUED | OUTPATIENT
Start: 2020-11-05 | End: 2020-11-05 | Stop reason: HOSPADM

## 2020-11-05 RX ADMIN — PANTOPRAZOLE SODIUM 40 MG: 40 INJECTION, POWDER, FOR SOLUTION INTRAVENOUS at 06:19

## 2020-11-05 RX ADMIN — PROPOFOL 15 MCG/KG/MIN: 10 INJECTION, EMULSION INTRAVENOUS at 11:34

## 2020-11-05 RX ADMIN — PANTOPRAZOLE SODIUM 40 MG: 40 INJECTION, POWDER, FOR SOLUTION INTRAVENOUS at 06:11

## 2020-11-05 RX ADMIN — SODIUM CHLORIDE 100 ML/HR: 900 INJECTION, SOLUTION INTRAVENOUS at 11:34

## 2020-11-05 RX ADMIN — SODIUM CHLORIDE 100 ML/HR: 9 INJECTION, SOLUTION INTRAVENOUS at 02:54

## 2020-11-05 RX ADMIN — ONDANSETRON HYDROCHLORIDE 4 MG: 2 INJECTION, SOLUTION INTRAMUSCULAR; INTRAVENOUS at 09:10

## 2020-11-05 RX ADMIN — ALBUMIN HUMAN 25 G: 0.25 SOLUTION INTRAVENOUS at 02:52

## 2020-11-05 RX ADMIN — MIDAZOLAM HYDROCHLORIDE 2 MG: 2 INJECTION, SOLUTION INTRAMUSCULAR; INTRAVENOUS at 11:33

## 2020-11-05 RX ADMIN — PROPOFOL 50 MCG/KG/MIN: 10 INJECTION, EMULSION INTRAVENOUS at 14:29

## 2020-11-05 RX ADMIN — OXYCODONE HYDROCHLORIDE AND ACETAMINOPHEN 1 TABLET: 5; 325 TABLET ORAL at 02:20

## 2020-11-05 RX ADMIN — POTASSIUM CHLORIDE 40 MEQ: 10 CAPSULE, COATED, EXTENDED RELEASE ORAL at 06:03

## 2020-11-05 RX ADMIN — MILRINONE LACTATE 0.5 MCG/KG/MIN: 0.2 INJECTION, SOLUTION INTRAVENOUS at 13:24

## 2020-11-05 RX ADMIN — ONDANSETRON HYDROCHLORIDE 4 MG: 2 INJECTION, SOLUTION INTRAMUSCULAR; INTRAVENOUS at 08:31

## 2020-11-05 RX ADMIN — DOPAMINE HYDROCHLORIDE 5 MCG/KG/MIN: 160 INJECTION, SOLUTION INTRAVENOUS at 02:51

## 2020-11-05 RX ADMIN — FUROSEMIDE: 10 INJECTION, SOLUTION INTRAMUSCULAR; INTRAVENOUS at 09:15

## 2020-11-05 RX ADMIN — PHENYLEPHRINE HYDROCHLORIDE 0.5 MCG/KG/MIN: 10 INJECTION INTRAVENOUS at 08:46

## 2020-11-05 RX ADMIN — MIDAZOLAM HYDROCHLORIDE: 2 INJECTION, SOLUTION INTRAMUSCULAR; INTRAVENOUS at 11:33

## 2020-11-05 RX ADMIN — OXYCODONE HYDROCHLORIDE AND ACETAMINOPHEN 1 TABLET: 5; 325 TABLET ORAL at 06:24

## 2020-11-05 RX ADMIN — PERFLUTREN 1.5 ML: 6.52 INJECTION, SUSPENSION INTRAVENOUS at 11:46

## 2020-11-05 RX ADMIN — SODIUM CHLORIDE 100 ML/HR: 900 INJECTION, SOLUTION INTRAVENOUS at 08:25

## 2020-11-05 RX ADMIN — HEPARIN SODIUM 11.35 UNITS/KG/HR: 10000 INJECTION, SOLUTION INTRAVENOUS at 14:07

## 2020-11-05 RX ADMIN — DOBUTAMINE HYDROCHLORIDE 5 MCG/KG/MIN: 100 INJECTION INTRAVENOUS at 08:11

## 2020-11-05 RX ADMIN — FUROSEMIDE: 10 INJECTION, SOLUTION INTRAMUSCULAR; INTRAVENOUS at 11:32

## 2020-11-05 RX ADMIN — MIDAZOLAM HYDROCHLORIDE 10 MG/HR: 5 INJECTION, SOLUTION INTRAMUSCULAR; INTRAVENOUS at 13:00

## 2020-11-05 RX ADMIN — ONDANSETRON HYDROCHLORIDE 4 MG: 2 INJECTION, SOLUTION INTRAMUSCULAR; INTRAVENOUS at 02:47

## 2020-11-05 RX ADMIN — SODIUM CHLORIDE 100 ML/HR: 900 INJECTION, SOLUTION INTRAVENOUS at 02:54

## 2020-11-05 NOTE — DISCHARGE SUMMARY
Discharge Summary  Papa Jimenez MD  Hospitalist     Date of Discharge:  11/5/2020    Discharge Diagnosis:      Acute myocardial infarction (CMS/HCC)    Shock, cardiogenic (CMS/HCC)    Acute systolic heart failure (CMS/HCC)    Acute respiratory failure with hypoxia (CMS/HCC)    CAD (coronary artery disease)      Presenting Problem/History of Present Illness  Chest pain    Hospital Course  Patient is a 40 y.o. male admitted as a transfer from [facility] for chest pain/acute MI (STEMI). Despite a coronary angioplasty performed on emergent basis the patient developed cardiogenic shock/severe hypotension requiring immediate intubation, mechanical ventilation, IV Dobutamine, IV Primacor, IV Heparin, and placement of an intra-aortic balloon pump replaced shortly afterwards with an Impella catheter for hemodynamic support.    He will be transferred on an emergency basis to [facility] for further advanced cardiac care including LVAD.    Procedures Performed  Procedure(s):  Left Heart Cath    Consults:   Consults     Date and Time Order Name Status Description    11/5/2020 0745 Inpatient Cardiology Consult      11/5/2020 0155 Inpatient Hospitalist Consult      11/5/2020 0155 Inpatient Cardiothoracic Surgery Consult Completed         Condition on Discharge: Critical    Vital Signs  Temp:  [97.9 °F (36.6 °C)] 97.9 °F (36.6 °C)  Heart Rate:  [] 113  Resp:  [1-26] 16  BP: ()/(49-80) 123/66  Arterial Line BP: ()/(34-91) 100/74  FiO2 (%):  [100 %] 100 %    Physical Exam:  Physical Exam  Vitals signs reviewed.   Constitutional:       General: He is not in acute distress.     Appearance: Normal appearance. He is ill-appearing.   HENT:      Head: Normocephalic and atraumatic.   Eyes:      General: No scleral icterus.     Extraocular Movements: Extraocular movements intact.      Pupils: Pupils are equal, round, and reactive to light.   Neck:      Musculoskeletal: Normal range of motion and neck  supple. No neck rigidity or muscular tenderness.      Vascular: No carotid bruit.   Cardiovascular:      Rate and Rhythm: Normal rate and regular rhythm.   Pulmonary:      Effort: No respiratory distress.      Breath sounds: Rales present. No rhonchi.   Abdominal:      General: Bowel sounds are normal. There is no distension.      Palpations: Abdomen is soft. There is no mass.      Tenderness: There is no abdominal tenderness.      Hernia: No hernia is present.   Musculoskeletal: Normal range of motion.         General: No swelling, tenderness, deformity or signs of injury.   Lymphadenopathy:      Cervical: No cervical adenopathy.   Skin:     General: Skin is dry.      Coloration: Skin is pale. Skin is not jaundiced.      Findings: No bruising or erythema.   Neurological:      Comments: Sedated / intubated         Discharge Disposition  [facility] ([place] - External)    Discharge Medications     Discharge Medications      New Medications      Instructions Start Date   acetaminophen 325 MG tablet  Commonly known as: TYLENOL   650 mg, Oral, Every 4 Hours PRN      aspirin 325 MG EC tablet   325 mg, Oral, Daily   Start Date: November 6, 2020     atorvastatin 10 MG tablet  Commonly known as: LIPITOR   40 mg, Oral, Nightly      carvedilol 3.125 MG tablet  Commonly known as: COREG   3.125 mg, Oral, Every 12 Hours Scheduled      DOBUTamine 1-5 MG/ML-% infusion  Commonly known as: DOBUTREX   2-20 mcg/kg/min (176.2-1,762 mcg/min), Intravenous, Titrated      DOPamine 1.6-5 MG/ML-% infusion  Commonly known as: INTROPIN   5 mcg/kg/min (440.5 mcg/min), Intravenous, Titrated      heparin (porcine) 100-0.45 UNIT/ML-% infusion   10 Units/kg/hr (881 Units/hr), Intravenous, Titrated      losartan 25 MG tablet  Commonly known as: COZAAR   12.5 mg, Oral, Daily   Start Date: November 6, 2020     midazolam (VERSED) 50mg/100mL normal saline 50 mg/100 mL solution   1-10 mg/hr (1-10 mg/hr), Intravenous, Titrated      Milrinone Lactate  in Dextrose 20-5 MG/100ML-% infusion   0.25-0.75 mcg/kg/min (22.025-66.075 mcg/min), Intravenous, Titrated      ondansetron 4 MG/2ML injection  Commonly known as: ZOFRAN   4 mg, Intravenous, Every 6 Hours PRN      pantoprazole 40 MG injection  Commonly known as: PROTONIX   40 mg, Intravenous, Every Morning Before Breakfast      phenylephrine 200 MCG/ML solution  Commonly known as: MICK-SYNEPHRINE   0.5-3 mcg/kg/min (44..3 mcg/min), Intravenous, Titrated      propofol 10 mg/mL emulsion infusion  Commonly known as: DIPRIVAN   5-50 mcg/kg/min (440.5-4,405 mcg/min), Intravenous, Titrated      sodium chloride 0.9 % solution   100 mL/hr (100 mL/hr), Intravenous, Continuous             Discharge Diet: NPO    Activity at Discharge: intubated / sedated    Follow-up Appointments  No future appointments.         Papa Jimenez MD  11/05/20  15:18 CST

## 2020-11-05 NOTE — NURSING NOTE
Pt arrived back to unit via bed with o2 and transport monitor from cath lab with IABP placement.  Dr. Kennedy contacted again related to balloon pump augmentation 65-70, assisted bp 50s/30s.  Pt on NRB sating 95% with EKG switching 120s to 70s.  On dobutamine at 10mcg/kg/min and Neosynephrine 3mcg/kg/min.  Dopamine stopped per cath lab.  Patient family at bedside being updated.  Patient became distressed, disoriented, dusky/cyanotic in face, contracted arms and turned on side.  Dr. Kennedy at bedside and Anesthesia called 77-stat.  Patient required emergency intubation with 20mg etomidate and 100mg succinylcholine given IV per Dr. Moreno verbal order for induction.  Once intubated, pt continued to have difficulty with airway/sats needing more sedation.  Total of 6mg versed IV push given and propofol drip initiated  OG placed. Pt unstable again needing use of temporary venous pacer because pt lizbeth down again.  Cath lab staff back at bedside and updated.  Chest xray obtained, and pt transported back to cath lab

## 2020-11-05 NOTE — H&P
Cardiology History and Physical Note        Patient Name: Anshul Tracy  Age/Sex: 40 y.o. male  : 1980  MRN: [id number]    Date of Admission : 2020    Primary care Physician: Provider, No Known    Reason for Admission: Chest pain acute anterior wall myocardial infarction      Subjective:       Chief Complaint: Chest pain    History of Present Illness:  Anshul Tracy is a 40 y.o. male     Body mass index is 27.09 kg/m².  With no known past medical history with previous history of tobacco abuse and family history for coronary artery disease who presented to [facility] with symptoms of chest pain.  Patient a week ago had symptoms of bronchitis and was evaluated in the urgent care center.    Patient last night had symptoms of chest discomfort associated with nausea and shortness of breath.  Patient initially attributed his symptom complex to the recent diagnosis of bronchitis.  Patient continued to have on and off discomfort today.  Patient symptoms worsen and subsequently presented to the emergency room.  Patient initial resting electrocardiogram in the emergency room revealed sinus rhythm with ST segment elevation in lead V2 to V6.  Patient troponin at [facility] was 22.  Due to the patient EKG changes had received a call from the  for transfer to [facility].  After discussing with the emergency room physician at [facility] patient had continued to have symptoms of chest pain and electrocardiographic changes suggestive of acute myocardial infarction patient was transferred to [facility] via helicopter.    Patient on arrival to [facility] was virtually chest pain-free.  Patient continued to have low blood pressure with persistent ST-T wave changes.    Due to the patient persistent ST-T wave changes patient was recommended emergent coronary angiogram and rescue PTCA.  Risk-benefit treatment option for the coronary  angiogram were discussed with the patient and an informed consent was obtained.  Patient Mallampati score #2 patient ASA classification 1 #2.  Risk for minimal sedation was also discussed with the patient.    Patient on questioning denies previous cardiac evaluation.  Patient denies any symptoms of palpitation.  Patient denies any symptoms of lightheaded dizziness or near syncope.  Patient denies any nausea vomiting.    Patient denies any episodes of any bleeding diastasis.    Patient 10 point review of system except for what stated in the history of present illness and negative.      Concurrent Medical History:  1.  Chest pain.  2.  Acute anterior lateral myocardial infarction.  3.  Family history of coronary artery disease.  4.  Previous history of tobacco abuse.    Past Surgical History: None      Family History: Family history significant for father who had coronary artery disease      Social History: Quit smoking 6 years ago social alcohol intake       Cardiac Risk factor:   1.  Male.  2.  Previous history of tobacco abuse.  3.  Family history of coronary artery disease    Allergies:  No Known Allergies    Home Medication::    None    Review of Systems   Constitutional: Negative for chills, fever and unexpected weight change.   HENT: Negative for hearing loss and nosebleeds.    Eyes: Negative for visual disturbance.   Respiratory: Positive for chest tightness and shortness of breath. Negative for cough and wheezing.    Cardiovascular: Positive for chest pain. Negative for palpitations and leg swelling.   Gastrointestinal: Negative for abdominal pain, blood in stool, constipation, diarrhea, nausea and vomiting.   Endocrine: Negative for cold intolerance, heat intolerance, polydipsia, polyphagia and polyuria.   Genitourinary: Negative for hematuria.   Musculoskeletal: Negative for joint swelling, myalgias and neck pain.   Skin: Negative for color change, rash and wound.   Neurological: Negative for dizziness,  "seizures, syncope, light-headedness, numbness and headaches.   Hematological: Does not bruise/bleed easily.         Objective:     /78 (BP Location: Right arm, Patient Position: Lying)   Pulse 112   Temp 97.9 °F (36.6 °C) (Temporal)   Resp 20   Ht 180.3 cm (71\")   Wt 88.1 kg (194 lb 3.2 oz)   SpO2 92%   BMI 27.09 kg/m²     Constitutional:       Appearance: Well-developed.   Eyes:      General: No scleral icterus.     Conjunctiva/sclera: Conjunctivae normal.      Pupils: Pupils are equal, round, and reactive to light.   HENT:      Head: Normocephalic and atraumatic.      Left Ear: External ear normal.      Nose: Nose normal.   Neck:      Musculoskeletal: Normal range of motion and neck supple.      Thyroid: No thyromegaly.      Vascular: No JVD.      Trachea: No tracheal deviation.      Lymphadenopathy: No cervical adenopathy.   Pulmonary:      Breath sounds: Normal breath sounds. No stridor.   Cardiovascular:      PMI at left midclavicular line. Normal rate. Regular rhythm. Normal S1. Normal S2.      Murmurs: There is no murmur.      No gallop. No click. No rub.   Pulses:     Intact distal pulses.   Edema:     Peripheral edema absent.   Abdominal:      General: Bowel sounds are normal.   Musculoskeletal: Normal range of motion.   Skin:     General: Skin is warm and dry.   Neurological:      Mental Status: Alert and oriented to person, place, and time.      Deep Tendon Reflexes: Reflexes are normal and symmetric.   Psychiatric:         Behavior: Behavior normal.         Thought Content: Thought content normal.         Judgment: Judgment normal.         Lab Review:     Results from last 7 days   Lab Units 11/04/20  2351   SODIUM mmol/L 128*   POTASSIUM mmol/L 3.5   CHLORIDE mmol/L 97*   CO2 mmol/L 20.0*   BUN mg/dL 10   CREATININE mg/dL 0.82   CALCIUM mg/dL 8.0*   BILIRUBIN mg/dL 0.6   ALK PHOS U/L 52   ALT (SGPT) U/L 54*   AST (SGOT) U/L 217*   GLUCOSE mg/dL 121*     Results from last 7 days   Lab Units " 11/04/20  2351   TROPONIN T ng/mL 2.090*         Results from last 7 days   Lab Units 11/04/20  2352   WBC 10*3/mm3 15.78*   HEMOGLOBIN g/dL 14.1   HEMATOCRIT % 40.4   PLATELETS 10*3/mm3 222                           EKG:   ECG/EMG Results (last 24 hours)     ** No results found for the last 24 hours. **          Imaging:  Imaging Results (Last 24 Hours)     ** No results found for the last 24 hours. **          I personally viewed and interpreted the patient's EKG/Telemetry data.    Assessment:   1.  Chest pain.  2.  Acute anterolateral myocardial infarction.  3.  Family history of coronary artery disease.          Plan:   1.  Chest pain.  Patient has symptoms of chest pain which started last night.  Patient today had continued to have persistent chest discomfort.  Patient on initial evaluation at [facility] had EKG changes suggestive of acute anterolateral myocardial infarction.  Patient initial troponin at [facility] was 22.  Due to the patient ongoing symptoms of chest pain with electrocardiographic changes patient was recommended an emergent coronary angiogram and rescue PTCA.  Risk-benefit treatment option for the coronary angiogram were discussed with the patient and an informed consent was obtained.  Plan was to proceed with a coronary angiogram.    2.  Risk factor modification.  Patient is currently not smoking.  Patient would undergo lipid profile check.    3.  Family history for coronary artery disease is noted.    4.  Elevated white blood cell count.  Patient had not had any febrile episode.  Patient did not complain of having any productive cough.  Patient would be evaluated by the hospitalist.  Would follow the white blood cell count.    Above plan of management were discussed with the patient      Time: time spent in face-to-face evaluation of greater than 55 minutes and interacting and formulating examining and discussing the plan with the patient with 50% of greater time  spent in face-to-face interaction.    Electronically signed by Deven Kennedy MD, 11/05/20, 12:49 AM CST.      Dictated utilizing Dragon dictation.

## 2020-11-05 NOTE — PROGRESS NOTES
Patient had shortness of breath with questionable seizure-like activity.patient telemetry monitor did not reveal of any evidence of any arrhythmia.  Patient was intubated in the intensive care unit.  The plan was to repeat the echocardiogram to rule out left ventricular apical thrombus with Definity and to proceed with the Impella for hemodynamic support.  Discussed with the family patient clinical condition.  Plan would be to take the patient to the Cath Lab and place a Alborn-Lenin catheter and Impella insertion.    Risk-benefit treatment options were discussed with the family at length.

## 2020-11-05 NOTE — DISCHARGE PLACEMENT REQUEST
"Domenica Tracy (40 y.o. Male)     Date of Birth Social Security Number Address Home Phone MRN    1980  [address] [address] [phone] [id number]    Tenriism Marital Status          None        Admission Date Admission Type Admitting Provider Attending Provider Department, Room/Bed    11/4/20 Urgent Deven Kennedy MD Kapadia, Deepak, MD [facility] CATH LAB, Pool/NONE    Discharge Date Discharge Disposition Discharge Destination                       Attending Provider: Deven Kennedy MD    Allergies: No Known Allergies    Isolation: None   Infection: COVID Screen (preop/placement) (11/05/20)   Code Status: CPR    Ht: 180.3 cm (71\")   Wt: 88.1 kg (194 lb 3.2 oz)    Admission Cmt: None   Principal Problem: Acute myocardial infarction (CMS/[facility]) [I21.9]                 Active Insurance as of 11/4/2020     Primary Coverage     Payor Plan Insurance Group Employer/Plan Group    [employer] [facility] [id number]     Payor Plan Address Payor Plan Phone Number Payor Plan Fax Number Effective Dates    [address] [phone]  1/9/2017 - None Entered    [place] [address]       Subscriber Name Subscriber Birth Date Member ID       DOMENICA TRACY 1980 [id number]                 Emergency Contacts      (Rel.) Home Phone Work Phone Mobile Phone    Gardenia Tracy (Mother) [phone] -- [phone]    Haja Tracy (Brother) [phone] -- [phone]              Current Facility-Administered Medications   Medication Dose Route Frequency Provider Last Rate Last Dose   • acetaminophen (TYLENOL) tablet 650 mg  650 mg Oral Q4H PRN Deven Kennedy MD       • aspirin EC tablet 325 mg  325 mg Oral Daily Deven Kennedy MD       • atorvastatin (LIPITOR) tablet 40 mg  40 mg Oral Nightly Deven Kennedy MD       • carvedilol (COREG) tablet 3.125 mg  3.125 mg Oral Q12H Deven Kennedy MD       • DOBUTamine " (DOBUTREX) 1 mg/mL infusion  2-20 mcg/kg/min Intravenous Titrated Deven Kennedy MD 26.4 mL/hr at 11/05/20 0811 5 mcg/kg/min at 11/05/20 0811   • DOPamine 400 mg in 250 mL D5W infusion  5 mcg/kg/min Intravenous Titrated Deven Kennedy MD   Stopped at 11/05/20 0948   • furosemide (LASIX) 10 MG/ML injection  - ADS Override Pull            • heparin 1000 units in sodium chloride 0.9% IV infusion    PRN Deven Kennedy MD   1,000 mL at 11/05/20 0919   • heparin 5000 units in sodium chloride 0.9% IV infusion    PRN Deven Kennedy MD   500 mL at 11/05/20 0920   • lidocaine (XYLOCAINE) 2% injection    PRN Deven Kennedy MD   20 mL at 11/05/20 0922   • losartan (COZAAR) half tablet 12.5 mg  12.5 mg Oral Daily Deven Kennedy MD       • ondansetron (ZOFRAN) 4 MG/2ML injection  - ADS Override Pull            • [MAR Hold] ondansetron (ZOFRAN) injection 4 mg  4 mg Intravenous Q6H PRN Deven Kennedy MD   4 mg at 11/05/20 0831   • [MAR Hold] oxyCODONE-acetaminophen (PERCOCET) 5-325 MG per tablet 1 tablet  1 tablet Oral Q4H PRN Wade Lau MD   1 tablet at 11/05/20 0624   • [MAR Hold] pantoprazole (PROTONIX) injection 40 mg  40 mg Intravenous Q AM Wade Lau MD   40 mg at 11/05/20 0619   • phenylephrine (MICK-SYNEPHRINE) 50 mg in 250 mL NS infusion  0.5-3 mcg/kg/min Intravenous Titrated Deven Kennedy MD 13.22 mL/hr at 11/05/20 0846 0.5 mcg/kg/min at 11/05/20 0846   • sodium chloride 0.9 % infusion  - ADS Override Pull            • sodium chloride 0.9 % infusion  100 mL/hr Intravenous Continuous Deven Kennedy  mL/hr at 11/05/20 0825 100 mL/hr at 11/05/20 0855

## 2020-11-05 NOTE — CONSULTS
CVTS CONSULTATION          Patient Care Team:  Provider, No Known as PCP - General     Requesting Provider: Dr. Kennedy requesting Dr. Elias    Chief complaint: Unstable Angina      SUBJECTIVE       History of Present Illness:  40 y.o. male with HTN(stable, increased risk stroke, rupture) and Hyperlipidemia(stable, increased risk cardiovascular events)  Strong FH of CAD, no prior surgical history on file. former smoker and quit 2018.  Patient developed 24 hour history of chest pain, dyspnea.  He was previously diagnosed with bronchitis which he attributed his symptoms too, as his symptoms progressed in severity he presented to [facility] with EKG revealing suspected STEMI and with a troponin of 22.  He was emergently transferred and taken to cath lab with the discovery of severe MVD, 100% occlusion to LAD which was successfully intervened upon with CARLOS-3 flow restored.  CTVS Dr. Elias was consulted to consider patient for elective surgical revascularization for his additional lesions following recovery from MI.  Unfortunately his condition has deteriorated over night, has remained on inotropic support currently with dopamine, dobutamine, and now camilo.  No other associated symptoms or modifying factors.      11/5/2020 LHC: PTA/Stent LAD, thrombectomy of 100% LAD.    11/5/2020 TTE: Final pending, EF 20%    The following portions of the patient's history were reviewed and updated as appropriate: allergies, current medications, past family history, past medical history, past social history, past surgical history and problem list.  Recent images independently reviewed.  Available laboratory values reviewed.    Review of Systems   Constitutional: Positive for diaphoresis and fatigue.   HENT: Negative for trouble swallowing and voice change.    Eyes: Negative for visual disturbance.   Respiratory: Positive for chest tightness and shortness of breath.    Cardiovascular: Positive for chest pain and palpitations.  "  Gastrointestinal: Positive for nausea.   Endocrine: Negative for polyuria.   Genitourinary: Negative for difficulty urinating.   Musculoskeletal: Positive for back pain.   Skin: Negative for wound.   Allergic/Immunologic: Negative for immunocompromised state.   Neurological: Positive for syncope and weakness.   Hematological: Does not bruise/bleed easily.   Psychiatric/Behavioral: Negative for confusion.        No past medical history on file.  No past surgical history on file.  Family History   Problem Relation Age of Onset   • No Known Problems Mother    • Heart disease Father    • No Known Problems Brother      Social History     Tobacco Use   • Smoking status: Former Smoker   Substance Use Topics   • Alcohol use: Yes     Alcohol/week: 1.0 standard drinks     Types: 1 Cans of beer per week   • Drug use: Not Currently     No medications prior to admission.     aspirin EC, 325 mg, Oral, Daily  atorvastatin, 40 mg, Oral, Nightly  carvedilol, 3.125 mg, Oral, Q12H  furosemide, , ,   losartan, 12.5 mg, Oral, Daily  ondansetron, , ,   [MAR Hold] pantoprazole, 40 mg, Intravenous, Q AM  sodium chloride, , ,       Allergies:  Patient has no known allergies.    OBJECTIVE        Vital Signs  Temp:  [97.9 °F (36.6 °C)] 97.9 °F (36.6 °C)  Heart Rate:  [] 72  Resp:  [1-24] 22  BP: ()/(49-79) 80/49  Arterial Line BP: ()/(41-91) 76/44    Flowsheet Rows      First Filed Value   Admission Height  180.3 cm (71\") Documented at 11/04/2020 2250   Admission Weight  82.7 kg (182 lb 5.1 oz) Documented at 11/04/2020 2250        180.3 cm (71\")    Physical Exam  Vitals signs and nursing note reviewed.   Constitutional:       Appearance: He is ill-appearing.   HENT:      Head: Normocephalic.      Mouth/Throat:      Mouth: Mucous membranes are moist.   Eyes:      Pupils: Pupils are equal, round, and reactive to light.   Neck:      Musculoskeletal: Normal range of motion.   Cardiovascular:      Rate and Rhythm: Normal rate. " Rhythm irregular.      Pulses: Normal pulses.      Comments: Occasional pause, ectopy  Pulmonary:      Effort: Respiratory distress present.      Breath sounds: No wheezing.      Comments: NOn-rebreather, increased effort  Abdominal:      General: Abdomen is flat.   Musculoskeletal: Normal range of motion.   Skin:     General: Skin is warm and dry.      Capillary Refill: Capillary refill takes 2 to 3 seconds.   Neurological:      Mental Status: He is alert. Mental status is at baseline.         Results Review:   Lab Results (last 24 hours)     Procedure Component Value Units Date/Time    Blood Gas, Arterial - [[id number]]  (Abnormal) Collected: 11/05/20 0948    Specimen: Arterial Blood Updated: 11/05/20 0950     Site --     Comment: N/A        Lan's Test --     Comment: N/A        pH, Arterial 7.374 pH units      pCO2, Arterial 34.5 mm Hg      pO2, Arterial 107.0 mm Hg      HCO3, Arterial 20.1 mmol/L      Base Excess, Arterial -4.3 mmol/L      O2 Saturation Calculated 98.4 %      Barometric Pressure for Blood Gas --     Comment: N/A        Modality --     Comment: N/A       COVID PRE-OP / PRE-PROCEDURE SCREENING ORDER (NO ISOLATION) - Swab, Nasopharynx [[id number]] Collected: 11/05/20 0900    Specimen: Swab from Nasopharynx Updated: 11/05/20 0911    Narrative:      The following orders were created for panel order COVID PRE-OP / PRE-PROCEDURE SCREENING ORDER (NO ISOLATION) - Swab, Nasopharynx.  Procedure                               Abnormality         Status                     ---------                               -----------         ------                     COVID-PRINCESS Patel IN-HOUS...[[id number]]                      In process                   Please view results for these tests on the individual orders.    PRINCESS SCHMITT IN-HOUSE, NP SWAB IN TRANSPORT MEDIA 8-10 HR TAT - Swab, Nasopharynx [[id number]] Collected: 11/05/20 0900    Specimen: Swab from Nasopharynx Updated: 11/05/20 0911    Blood Gas, Arterial  - [[id number]]  (Abnormal) Collected: 11/05/20 0800    Specimen: Arterial Blood Updated: 11/05/20 0808     Site Arterial Line     Lan's Test N/A     pH, Arterial 7.425 pH units      pCO2, Arterial 33.9 mm Hg      Comment: 84 Value below reference range        pO2, Arterial 55.5 mm Hg      Comment: 84 Value below reference range        HCO3, Arterial 22.3 mmol/L      Base Excess, Arterial -1.4 mmol/L      Comment: 84 Value below reference range        O2 Saturation, Arterial 90.5 %      Comment: 84 Value below reference range        Barometric Pressure for Blood Gas 756 mmHg      Modality Nasal Cannula     Flow Rate 64.0 lpm      Ventilator Mode NA     Collected by MAKEDA PARKINSON     Comment: Meter: [id number]     :  [id number]       Renal Function Panel [[id number]]  (Abnormal) Collected: 11/05/20 0606    Specimen: Blood Updated: 11/05/20 0649     Glucose 137 mg/dL      BUN 13 mg/dL      Creatinine 1.10 mg/dL      Sodium 137 mmol/L      Potassium 4.5 mmol/L      Chloride 102 mmol/L      CO2 25.0 mmol/L      Calcium 9.1 mg/dL      Albumin 4.20 g/dL      Phosphorus 2.6 mg/dL      Anion Gap 10.0 mmol/L      BUN/Creatinine Ratio 11.8     eGFR Non African Amer 73 mL/min/1.73     Narrative:      GFR Normal >60  Chronic Kidney Disease <60  Kidney Failure <15      Troponin [[id number]]  (Abnormal) Collected: 11/05/20 0606    Specimen: Blood Updated: 11/05/20 0644     Troponin T 8.080 ng/mL     Narrative:      Troponin T Reference Range:  <= 0.03 ng/mL-   Negative for AMI  >0.03 ng/mL-     Abnormal for myocardial necrosis.  Clinicians would have to utilize clinical acumen, EKG, Troponin and serial changes to determine if it is an Acute Myocardial Infarction or myocardial injury due to an underlying chronic condition.       Results may be falsely decreased if patient taking Biotin.      Lipid Panel [[id number]]  (Abnormal) Collected: 11/05/20 0606    Specimen: Blood Updated: 11/05/20 0636     Total Cholesterol 201 mg/dL       Triglycerides 102 mg/dL      HDL Cholesterol 35 mg/dL      LDL Cholesterol  147 mg/dL      VLDL Cholesterol 19 mg/dL      LDL/HDL Ratio 4.16    Narrative:      Cholesterol Reference Ranges  (U.S. Department of Health and Human Services ATP III Classifications)    Desirable          <200 mg/dL  Borderline High    200-239 mg/dL  High Risk          >240 mg/dL      Triglyceride Reference Ranges  (U.S. Department of Health and Human Services ATP III Classifications)    Normal           <150 mg/dL  Borderline High  150-199 mg/dL  High             200-499 mg/dL  Very High        >500 mg/dL    HDL Reference Ranges  (U.S. Department of Health and Human Services ATP III Classifcations)    Low     <40 mg/dl (major risk factor for CHD)  High    >60 mg/dl ('negative' risk factor for CHD)        LDL Reference Ranges  (U.S. Department of Health and Human Services ATP III Classifcations)    Optimal          <100 mg/dL  Near Optimal     100-129 mg/dL  Borderline High  130-159 mg/dL  High             160-189 mg/dL  Very High        >189 mg/dL    CBC & Differential [[id number]]  (Abnormal) Collected: 11/05/20 0606    Specimen: Blood Updated: 11/05/20 0618    Narrative:      The following orders were created for panel order CBC & Differential.  Procedure                               Abnormality         Status                     ---------                               -----------         ------                     CBC Auto Differential[[id number]]        Abnormal            Final result                 Please view results for these tests on the individual orders.    CBC Auto Differential [[id number]]  (Abnormal) Collected: 11/05/20 0606    Specimen: Blood Updated: 11/05/20 0618     WBC 18.39 10*3/mm3      RBC 4.58 10*6/mm3      Hemoglobin 14.8 g/dL      Hematocrit 44.1 %      MCV 96.3 fL      MCH 32.3 pg      MCHC 33.6 g/dL      RDW 13.0 %      RDW-SD 45.8 fl      MPV 11.1 fL      Platelets 231 10*3/mm3      Neutrophil % 80.3 %       Lymphocyte % 7.5 %      Monocyte % 11.3 %      Eosinophil % 0.0 %      Basophil % 0.3 %      Immature Grans % 0.6 %      Neutrophils, Absolute 14.77 10*3/mm3      Lymphocytes, Absolute 1.38 10*3/mm3      Monocytes, Absolute 2.08 10*3/mm3      Eosinophils, Absolute 0.00 10*3/mm3      Basophils, Absolute 0.05 10*3/mm3      Immature Grans, Absolute 0.11 10*3/mm3      nRBC 0.0 /100 WBC     Troponin [[id number]]  (Abnormal) Collected: 11/05/20 0234    Specimen: Blood Updated: 11/05/20 0343     Troponin T 9.030 ng/mL     Narrative:      Troponin T Reference Range:  <= 0.03 ng/mL-   Negative for AMI  >0.03 ng/mL-     Abnormal for myocardial necrosis.  Clinicians would have to utilize clinical acumen, EKG, Troponin and serial changes to determine if it is an Acute Myocardial Infarction or myocardial injury due to an underlying chronic condition.       Results may be falsely decreased if patient taking Biotin.      Hemoglobin A1c [[id number]]  (Normal) Collected: 11/05/20 0234    Specimen: Blood Updated: 11/05/20 0324     Hemoglobin A1C 5.20 %     Narrative:      Hemoglobin A1C Ranges:    Increased Risk for Diabetes  5.7% to 6.4%  Diabetes                     >= 6.5%  Diabetic Goal                < 7.0%    Extra Tubes [[id number]] Collected: 11/04/20 2354    Specimen: Blood, Venous Line Updated: 11/05/20 0100    Narrative:      The following orders were created for panel order Extra Tubes.  Procedure                               Abnormality         Status                     ---------                               -----------         ------                     Lavender Top[[id number]]                                     Final result                 Please view results for these tests on the individual orders.    Lavender Top [[id number]] Collected: 11/04/20 2354    Specimen: Blood Updated: 11/05/20 0100     Extra Tube hold for add-on     Comment: Auto resulted       Comprehensive Metabolic Panel [[id number]]  (Abnormal)  Collected: 11/04/20 2351    Specimen: Blood Updated: 11/05/20 0015     Glucose 121 mg/dL      BUN 10 mg/dL      Creatinine 0.82 mg/dL      Sodium 128 mmol/L      Potassium 3.5 mmol/L      Chloride 97 mmol/L      CO2 20.0 mmol/L      Calcium 8.0 mg/dL      Total Protein 5.5 g/dL      Albumin 3.20 g/dL      ALT (SGPT) 54 U/L      AST (SGOT) 217 U/L      Alkaline Phosphatase 52 U/L      Total Bilirubin 0.6 mg/dL      eGFR Non African Amer 103 mL/min/1.73      Globulin 2.3 gm/dL      A/G Ratio 1.4 g/dL      BUN/Creatinine Ratio 12.2     Anion Gap 11.0 mmol/L     Narrative:      GFR Normal >60  Chronic Kidney Disease <60  Kidney Failure <15      Troponin [[id number]]  (Abnormal) Collected: 11/04/20 2351    Specimen: Blood Updated: 11/05/20 0014     Troponin T 2.090 ng/mL     Narrative:      Troponin T Reference Range:  <= 0.03 ng/mL-   Negative for AMI  >0.03 ng/mL-     Abnormal for myocardial necrosis.  Clinicians would have to utilize clinical acumen, EKG, Troponin and serial changes to determine if it is an Acute Myocardial Infarction or myocardial injury due to an underlying chronic condition.       Results may be falsely decreased if patient taking Biotin.      CBC & Differential [[id number]]  (Abnormal) Collected: 11/04/20 2352    Specimen: Blood Updated: 11/05/20 0004    Narrative:      The following orders were created for panel order CBC & Differential.  Procedure                               Abnormality         Status                     ---------                               -----------         ------                     CBC Auto Differential[[id number]]        Abnormal            Final result                 Please view results for these tests on the individual orders.    CBC Auto Differential [[id number]]  (Abnormal) Collected: 11/04/20 2352    Specimen: Blood Updated: 11/05/20 0004     WBC 15.78 10*3/mm3      RBC 4.34 10*6/mm3      Hemoglobin 14.1 g/dL      Hematocrit 40.4 %      MCV 93.1 fL      MCH 32.5 pg       MCHC 34.9 g/dL      RDW 12.7 %      RDW-SD 43.4 fl      MPV 10.9 fL      Platelets 222 10*3/mm3      Neutrophil % 73.7 %      Lymphocyte % 14.1 %      Monocyte % 11.2 %      Eosinophil % 0.2 %      Basophil % 0.4 %      Immature Grans % 0.4 %      Neutrophils, Absolute 11.63 10*3/mm3      Lymphocytes, Absolute 2.23 10*3/mm3      Monocytes, Absolute 1.77 10*3/mm3      Eosinophils, Absolute 0.03 10*3/mm3      Basophils, Absolute 0.06 10*3/mm3      Immature Grans, Absolute 0.06 10*3/mm3      nRBC 0.0 /100 WBC         Imaging Results (Last 24 Hours)     Procedure Component Value Units Date/Time    XR Chest 1 View [[id number]] Collected: 11/05/20 0822     Updated: 11/05/20 0848    Narrative:      Chest x-ray single view.       CLINICAL INDICATION: Shortness of breath    COMPARISON: None    FINDINGS: Cardiac silhouette is normal in size. Pulmonary  vascularity is unremarkable.     Bilateral reticulonodular infiltrative changes. This suggests an  infectious process.          Impression:      Heart normal size. Bilateral reticular nodular  infiltrative changes as described above. CT chest may be useful  for further evaluation.    Electronically signed by:  Shiv Hampton MD  11/5/2020 8:47 AM CST  Workstation: [id number]              ASSESSMENT/PLAN         Acute myocardial infarction (CMS/HCC)    CAD (coronary artery disease)    Acute respiratory failure with hypoxia (CMS/HCC)    Acute systolic heart failure (CMS/HCC)    Shock, cardiogenic (CMS/HCC)      Assessment & Plan    Severe Multi-Vessel CAD  Echocardiogram obtained at bedside demonstrating depressed EF of approx 20%.  He currently appears to be progressing to cardiogenic shock.  Dr. Kennedy was notified of these results and patient is to have IABP placed this morning.  As it relates to his multi-vessel CAD, he is an exceedingly high surgical risk candidate for open revascularization due to recent MI, depressed EF, and current hypoxia with increasing O2 demand.  Would  be happy to reassess surgical candidacy on elective basis should his condition improve.    Current plans are to transfer patient to [facility] for additional treatment options as it relates to his ischemic heart failure.     Thank you for the opportunity to participate in this patient's care.        I discussed the patients findings and my recommendations with Dr. Elias            This document has been electronically signed by El Reed AGACNP-BC @  On November 5, 2020 10:05 CST

## 2020-11-05 NOTE — NURSING NOTE
Patient had a timeframe of 0932-0746 that patient declined in status.  Heart rate sinus tach 120 and had multiple pauses with blood pressure dropping 70s/40s. Rhythm changed from tachy-lizbeth then NSR 1st degree block. Patient became nauseated, diaphoretic, chest tightness, short of breath. Non-rebreather applied when oxygen sats 87% on 6L nc. Had been on dopamine drip.  Continued to increase dopamine drip as per order. communicated with Dr. Kennedy per telephone during the status changse of patient and orders received.  From phone calls orders included: abg, chest xray, echo, starting dobutamine drip, neosynephrine drip, and reswab for covid.  Dr. Jimenez updated on patient changes.  By 0900 Dopamine drip 20mcg/kg/atif.  Dobutamine increased to 10mcg/kg/min.  And Neosynephrine at 2mcg/kg/min were final doses when cath lab arrived at bedside to transport patient per bed with transport monitor for possible balloon pump placement and any other indicated procedures. Pt aware of emergent need for procedure and alert/oriented. I updated his brother per telephone prior to patient leaving.

## 2020-11-05 NOTE — PROGRESS NOTES
TWO PATIENT IDENTIFIERS WERE USED. CONSENT WAS SIGNED PER FAMILY EDUCATION MATERIAL WAS GIVEN TO PATIENT AND / OR FAMILY. THE PATIENT WAS DRAPED WITH FULL BODY DRAPE AND PATIENT'S RIGHT ARM WAS PREPPED WITH CHLORAPREP.  ULTRASOUND WAS USED TO LOCALIZE THERIGHT BASILIC  VEIN. SUBCUTANEOUS TISSUE AT THE CATHETER SITE WAS INFILTRATED WITH 2% LIDOCAINE. UNDER ULTRASOUND GUIDANCE, THE VEIN WAS ACCESSED WITH A 21GAUGE  NEEDLE. AN 0.018 WIRE WAS THEN THREADED THROUGH THE NEEDLE INTO THE CENTRAL VENOUS SYSTEM. THE 21GAUGE  NEEDLE WAS REMOVED AND A 5 [place] PEEL AWAY SHEATH WAS PLACED OVER THE WIRE. THE PICC LINE CATHETER WAS CUT AT 37 CM. THE PICC LINE CATHETER WAS THEN PLACED OVER THE WIRE INTO THE VEIN, THE SHEATH WAS PEELED AWAY,WIRE WAS REMOVED. CATHETER WAS FLUSHED WITH NORMAL SALINE AND TIPS APPLIED. BIOPATCH PLACED. CATHETER SECURED WITH STATLOCK AND TEGADERM. PATIENT TOLERATED PROCEDURE WELL. THIS WAS DONE IN THE   ICU      IMPRESSION: SUCCESSFUL PLACEMENT OF TRIPLE LUMEN SOLO PICC        Alfreda Wells  11/5/2020  13:46 CST

## 2020-11-05 NOTE — PROGRESS NOTES
Patient underwent a repeat transthoracic echocardiogram with Definity which ruled out apical thrombus.    Patient was brought to the cardiac catheterization lab and underwent Impella catheter placement and Whitfield-Lenin catheter after removal of the intra-aortic balloon pump for hemodynamic support.    Patient would be started on Primacor and continued on dobutamine.  Patient would be continued on ventilatory support.  Patient would be continued on heparin infusion at 1000 units an hour and would follow PTT.  Patient would undergo a repeat transthoracic echocardiogram.    Family was informed of patient's condition.    Patient was transferred back to the intensive care unit.

## 2020-11-05 NOTE — PLAN OF CARE
Goal Outcome Evaluation:  Plan of Care Reviewed With: patient  Progress: declining  Outcome Summary: Pt had episode this AM of bradying down to 40bpm and becoming hypotensive. MD informed with orders received and carried out. Albumin, dopamine and 250cc bolus given. Pt recoved and HR and B/P back to baseline. Will continue to monitor closely.

## 2020-11-05 NOTE — PROGRESS NOTES
Patient had episode of increasing shortness of breath with a drop in the blood pressure.  Patient on telemetry monitor had evidence of drop in the heart rate.  Patient resting EKG revealed sinus tachycardia with AV block possibly complete.  Patient complained of having some symptoms of chest tightness.  Patient troponin peaked at 9 subsequent troponin was 8.    Patient underwent a transthoracic echocardiogram bedside which revealed an ejection fraction of 20%.    Due to the patient drop in the blood pressure requiring dobutamine and dopamine the plan was to consider intra-aortic balloon pump versus an Impella .     Plan was to proceed with a repeat coronary angiogram to check patency of the left anterior descending artery and consider intra-aortic balloon pump insertion for blood pressure support.  Risk-benefit treatment option for the temporary pacemaker wire was also discussed with the patient and an informed consent was obtained.  Patient Mallampati score #2 patient ASA classification 1 #2.    Discussed with the patient the risk-benefit treatment option for the intra-aortic balloon pump.  Have also discussed with the patient possible transfer.  Patient brother lives in [place].  Have called and discussed at [facility].  Currently [facility] did not have any open beds.  Patient accepting physician was Tori Owens M.D.    Plan was to proceed a repeat coronary angiogram and balloon pump insertion.  Would also consider a temporary pacemaker wire.

## 2020-11-05 NOTE — NURSING NOTE
Pt transported to CCU via bed/transport monitor and vent.  Impella placed, IABP removed in cath lab, Left fem. [place] Lenin, Left femoral art. Right fem. Transvenous pacer.  Drips include: Dobutamine, Jackson-synephrine, Propofol, Versed, and newly started Milrinone and Heparin.  Dopamine was stopped in cath lab.  Family updated at bedside.  Flight team report given, Impella rep also at bedside to assist, and Report given to [facility].  Dr. Kennedy updated prior to transfer to helicopter pt in [facility], MD aware, continue with transfer.

## 2020-11-05 NOTE — ANESTHESIA PROCEDURE NOTES
Airway  Urgency: emergent    Airway not difficult    General Information and Staff    Patient location during procedure: ICU  Anesthesiologist: Wicho Moreno MD    Consent for Airway (if performed for an anesthetic, see related documentation for consents)  Patient identity confirmed: verbally with patient  Consent: No emergent situation. Verbal consent obtained.  Consent given by: patient      Indications and Patient Condition  Indications for airway management: airway protection    Preoxygenated: yes  MILS maintained throughout  Mask difficulty assessment: 1 - vent by mask    Final Airway Details  Final airway type: endotracheal airway        Successful intubation technique: direct laryngoscopy  Endotracheal tube insertion site: oral  Blade: Ben  Blade size: 3  Cormack-Lehane Classification: grade IIa - partial view of glottis  Placement verified by: capnometry   Measured from: lips  ETT/EBT  to lips (cm): 22  Number of attempts at approach: 1  Assessment: lips, teeth, and gum same as pre-op    Additional Comments  Teeth checked after intubation and no damage noted.  ctsp for emergent intubation, 20mg etomidate and 100mg sux given thru venous sheath; 7.5ett mac 3 blade for uneventful intubation.  Chest xray ordered after good etco2 change upon intubation. 22cm at lip.

## 2020-11-05 NOTE — CONSULTS
[facility] Medicine Consult  Consults    Date of Admission: 11/4/2020  Date of Consult: 11/05/20    Primary Care Physician: Provider, No Known  Referring Physician:  No ref. provider found      Chief Complaint/Reason for Consultation: Transferred from [facility] with STEMI    HPI: Briefly this is a 40-year-old gentleman who presented to the ED in [facility] with no real past medical history in terms of MD follow-up, this being the first time he has been in the hospital before as well as no MD follow-up since a child.  He does have the following problem list    1.  Positive family history of early cardiovascular events  -In father as well as brother both in their 40s  2.  History of tobacco use none in the past 2 years  3.  Social alcohol exposure    Patient presented to [facility] with substernal chest discomfort as well as increased cardiac awareness.  He was noted to have ST segment elevation in lead V2 to V6.  Patient was transferred urgently to [facility] and was seen by Dr. Kennedy with patient having stenting to his LAD.  We been asked to see for further evaluation and treatment.  Patient currently now pain-free without complaints    Past Medical History:  has no past medical history on file.    Past Surgical History:  has no past surgical history on file.    Family History: family history includes Heart disease in his father; No Known Problems in his brother and mother.     Social History:  reports that he has quit smoking. He does not have any smokeless tobacco history on file. He reports current alcohol use of about 1.0 standard drinks of alcohol per week. He reports previous drug use.    Allergies: No Known Allergies    Medications: Scheduled Meds:atorvastatin, 80 mg, Oral, Nightly  sodium chloride, , ,     Patient with no previous medications  Continuous Infusions:eptifibatide, , Last Rate: 2 mcg/kg/min (11/04/20 1806)      PRN  Meds:.eptifibatide  •  eptifibatide  •  oxyCODONE-acetaminophen    Review of Systems:  Review of Systems   Constitutional: Negative.    HENT: Negative.    Eyes: Negative.    Respiratory: Negative.    Cardiovascular: Negative.    Gastrointestinal: Negative.    Endocrine: Negative.    Genitourinary: Negative.    Musculoskeletal: Negative.    Skin: Negative.    Allergic/Immunologic: Negative.    Neurological: Negative.    Hematological: Negative.    Psychiatric/Behavioral: Negative.       Otherwise complete ROS is negative except as mentioned above.    Physical Exam:   Temp:  [97.9 °F (36.6 °C)] 97.9 °F (36.6 °C)  Heart Rate:  [112-125] 125  Resp:  [20] 20  BP: (110-113)/(74-78) 113/74  Arterial Line BP: (104-113)/(70-78) 113/78  Physical Exam  Vitals signs and nursing note reviewed.   Constitutional:       Appearance: Normal appearance.   HENT:      Head: Normocephalic and atraumatic.      Nose: Nose normal.      Mouth/Throat:      Mouth: Mucous membranes are moist.   Eyes:      Extraocular Movements: Extraocular movements intact.      Pupils: Pupils are equal, round, and reactive to light.   Neck:      Musculoskeletal: Normal range of motion.   Cardiovascular:      Rate and Rhythm: Normal rate and regular rhythm.   Pulmonary:      Effort: Pulmonary effort is normal.      Breath sounds: Normal breath sounds.   Abdominal:      General: Abdomen is flat. Bowel sounds are normal.      Palpations: Abdomen is soft.   Musculoskeletal: Normal range of motion.   Skin:     General: Skin is warm and dry.      Capillary Refill: Capillary refill takes less than 2 seconds.   Neurological:      General: No focal deficit present.      Mental Status: He is alert.   Psychiatric:         Mood and Affect: Mood normal.           Results Reviewed:  I have personally reviewed current lab, radiology, and data and agree with results.  Lab Results (last 24 hours)     Procedure Component Value Units Date/Time    Extra Tubes [[id number]]  Collected: 11/04/20 2354    Specimen: Blood, Venous Line Updated: 11/05/20 0100    Narrative:      The following orders were created for panel order Extra Tubes.  Procedure                               Abnormality         Status                     ---------                               -----------         ------                     Lavender Top[[id number]]                                     Final result                 Please view results for these tests on the individual orders.    Lavender Top [[id number]] Collected: 11/04/20 2354    Specimen: Blood Updated: 11/05/20 0100     Extra Tube hold for add-on     Comment: Auto resulted       Comprehensive Metabolic Panel [[id number]]  (Abnormal) Collected: 11/04/20 2351    Specimen: Blood Updated: 11/05/20 0015     Glucose 121 mg/dL      BUN 10 mg/dL      Creatinine 0.82 mg/dL      Sodium 128 mmol/L      Potassium 3.5 mmol/L      Chloride 97 mmol/L      CO2 20.0 mmol/L      Calcium 8.0 mg/dL      Total Protein 5.5 g/dL      Albumin 3.20 g/dL      ALT (SGPT) 54 U/L      AST (SGOT) 217 U/L      Alkaline Phosphatase 52 U/L      Total Bilirubin 0.6 mg/dL      eGFR Non African Amer 103 mL/min/1.73      Globulin 2.3 gm/dL      A/G Ratio 1.4 g/dL      BUN/Creatinine Ratio 12.2     Anion Gap 11.0 mmol/L     Narrative:      GFR Normal >60  Chronic Kidney Disease <60  Kidney Failure <15      Troponin [[id number]]  (Abnormal) Collected: 11/04/20 2351    Specimen: Blood Updated: 11/05/20 0014     Troponin T 2.090 ng/mL     Narrative:      Troponin T Reference Range:  <= 0.03 ng/mL-   Negative for AMI  >0.03 ng/mL-     Abnormal for myocardial necrosis.  Clinicians would have to utilize clinical acumen, EKG, Troponin and serial changes to determine if it is an Acute Myocardial Infarction or myocardial injury due to an underlying chronic condition.       Results may be falsely decreased if patient taking Biotin.      CBC & Differential [[id number]]  (Abnormal) Collected: 11/04/20 2352     Specimen: Blood Updated: 11/05/20 0004    Narrative:      The following orders were created for panel order CBC & Differential.  Procedure                               Abnormality         Status                     ---------                               -----------         ------                     CBC Auto Differential[[id number]]        Abnormal            Final result                 Please view results for these tests on the individual orders.    CBC Auto Differential [[id number]]  (Abnormal) Collected: 11/04/20 2664    Specimen: Blood Updated: 11/05/20 0004     WBC 15.78 10*3/mm3      RBC 4.34 10*6/mm3      Hemoglobin 14.1 g/dL      Hematocrit 40.4 %      MCV 93.1 fL      MCH 32.5 pg      MCHC 34.9 g/dL      RDW 12.7 %      RDW-SD 43.4 fl      MPV 10.9 fL      Platelets 222 10*3/mm3      Neutrophil % 73.7 %      Lymphocyte % 14.1 %      Monocyte % 11.2 %      Eosinophil % 0.2 %      Basophil % 0.4 %      Immature Grans % 0.4 %      Neutrophils, Absolute 11.63 10*3/mm3      Lymphocytes, Absolute 2.23 10*3/mm3      Monocytes, Absolute 1.77 10*3/mm3      Eosinophils, Absolute 0.03 10*3/mm3      Basophils, Absolute 0.06 10*3/mm3      Immature Grans, Absolute 0.06 10*3/mm3      nRBC 0.0 /100 WBC         Imaging Results (Last 24 Hours)     ** No results found for the last 24 hours. **          Assessment:  Active Hospital Problems    Diagnosis   • Acute myocardial infarction (CMS/[facility])     Impression    1.  STEMI  -Status post stent to LAD  -Patient currently on aspirin and Effient  -Add 80 mg of Lipitor daily and check lipid panel  -Also add beta-blocker with tachycardia    2.  Elevated random blood sugar  -Likely secondary stress  -Check A1c for a stratification    3.  Tachycardia  -Patient adamant no past history of drug use  -Check toxicology screen  -As above adding beta-blockade    CODE STATUS patient is a full code    Prophylaxis  -VT on heparin drip  -GI add PPI    Discussed with referring physician as  well as with ICU nursing please see orders.        Recommendations:    I discussed the patients findings and my recommendations with: Dr. Kennedy    I would like to thank Dr. Kennedy for involving us in the care of Anshul Tracy.  We will continue to follow while he is hospitalized.        Wade Lau MD

## 2020-11-06 NOTE — NURSING NOTE
[employer] team at bedside at 1500.  Bedside report given to flight RN.  Pt disconnected from our monitor to their transport monitor and IV pumps exchanged with their specific IV pumps.  Drip discussed with RN and rates.  Impella education given per Impella Rep.  Family updated about flight transfer, and pt discharged from our facility at 1600 with assistance of flight team, impella rep, and ccu staff.

## 2020-11-06 NOTE — PAYOR COMM NOTE
"Loida Jose Cwilbur   [facility]  phone [phone]  fax [phone]    Domenica Tracy (40 y.o. Male)     Date of Birth Social Security Number Address Home Phone MRN    1980  [id number] [phone] [id number]    [employer] Marital Status          None        Admission Date Admission Type Admitting Provider Attending Provider Department, Room/Bed    11/4/20 Urgent Deven Kennedy MD  [facility] STEPDOWN, 08/A    Discharge Date Discharge Disposition Discharge Destination        11/5/2020 [facility] ([place] - External)              Attending Provider: (none)   Allergies: No Known Allergies    Isolation: None   Infection: None   Code Status: Prior    Ht: 180.3 cm (71\")   Wt: 88.1 kg (194 lb 3.2 oz)    Admission Cmt: None   Principal Problem: Acute myocardial infarction (CMS/HCC) [I21.9]                 Active Insurance as of 11/4/2020     Primary Coverage     Payor Plan Insurance Group Employer/Plan Group    [employer] PPO [id number]     Payor Plan Address Payor Plan Phone Number Payor Plan Fax Number Effective Dates    [address] [phone]  1/9/2017 - None Entered    [place] [address]       Subscriber Name Subscriber Birth Date Member ID       DOMENICA TRACY 1980 [id number]                 Emergency Contacts      (Rel.) Home Phone Work Phone Mobile Phone    Gardenia Tracy (Mother) [phone] -- [phone]    DemarcusHaja cardona (Brother) [phone] -- [phone]               Discharge Summary      Papa Jimenez MD at 11/05/20 1503          Discharge Summary  Papa Jimenez MD  Hospitalist     Date of Discharge:  11/5/2020    Discharge Diagnosis:      Acute myocardial infarction (CMS/HCC)    Shock, cardiogenic (CMS/HCC)    Acute systolic heart failure (CMS/HCC)    Acute respiratory failure with hypoxia (CMS/HCC)    CAD (coronary artery " disease)      Presenting Problem/History of Present Illness  Chest pain    Hospital Course  Patient is a 40 y.o. male admitted as a transfer from [facility] for chest pain/acute MI (STEMI). Despite a coronary angioplasty performed on emergent basis the patient developed cardiogenic shock/severe hypotension requiring immediate intubation, mechanical ventilation, IV Dobutamine, IV Primacor, IV Heparin, and placement of an intra-aortic balloon pump replaced shortly afterwards with an Impella catheter for hemodynamic support.    He will be transferred on an emergency basis to [facility] for further advanced cardiac care including LVAD.    Procedures Performed  Procedure(s):  Left Heart Cath    Consults:   Consults     Date and Time Order Name Status Description    11/5/2020 0745 Inpatient Cardiology Consult      11/5/2020 0155 Inpatient Hospitalist Consult      11/5/2020 0155 Inpatient Cardiothoracic Surgery Consult Completed         Condition on Discharge: Critical    Vital Signs  Temp:  [97.9 °F (36.6 °C)] 97.9 °F (36.6 °C)  Heart Rate:  [] 113  Resp:  [1-26] 16  BP: ()/(49-80) 123/66  Arterial Line BP: ()/(34-91) 100/74  FiO2 (%):  [100 %] 100 %    Physical Exam:  Physical Exam  Vitals signs reviewed.   Constitutional:       General: He is not in acute distress.     Appearance: Normal appearance. He is ill-appearing.   HENT:      Head: Normocephalic and atraumatic.   Eyes:      General: No scleral icterus.     Extraocular Movements: Extraocular movements intact.      Pupils: Pupils are equal, round, and reactive to light.   Neck:      Musculoskeletal: Normal range of motion and neck supple. No neck rigidity or muscular tenderness.      Vascular: No carotid bruit.   Cardiovascular:      Rate and Rhythm: Normal rate and regular rhythm.   Pulmonary:      Effort: No respiratory distress.      Breath sounds: Rales present. No rhonchi.   Abdominal:      General: Bowel sounds are  normal. There is no distension.      Palpations: Abdomen is soft. There is no mass.      Tenderness: There is no abdominal tenderness.      Hernia: No hernia is present.   Musculoskeletal: Normal range of motion.         General: No swelling, tenderness, deformity or signs of injury.   Lymphadenopathy:      Cervical: No cervical adenopathy.   Skin:     General: Skin is dry.      Coloration: Skin is pale. Skin is not jaundiced.      Findings: No bruising or erythema.   Neurological:      Comments: Sedated / intubated         Discharge Disposition  [facility] ([place] - External)    Discharge Medications     Discharge Medications      New Medications      Instructions Start Date   acetaminophen 325 MG tablet  Commonly known as: TYLENOL   650 mg, Oral, Every 4 Hours PRN      aspirin 325 MG EC tablet   325 mg, Oral, Daily   Start Date: November 6, 2020     atorvastatin 10 MG tablet  Commonly known as: LIPITOR   40 mg, Oral, Nightly      carvedilol 3.125 MG tablet  Commonly known as: COREG   3.125 mg, Oral, Every 12 Hours Scheduled      DOBUTamine 1-5 MG/ML-% infusion  Commonly known as: DOBUTREX   2-20 mcg/kg/min (176.2-1,762 mcg/min), Intravenous, Titrated      DOPamine 1.6-5 MG/ML-% infusion  Commonly known as: INTROPIN   5 mcg/kg/min (440.5 mcg/min), Intravenous, Titrated      heparin (porcine) 100-0.45 UNIT/ML-% infusion   10 Units/kg/hr (881 Units/hr), Intravenous, Titrated      losartan 25 MG tablet  Commonly known as: COZAAR   12.5 mg, Oral, Daily   Start Date: November 6, 2020     midazolam (VERSED) 50mg/100mL normal saline 50 mg/100 mL solution   1-10 mg/hr (1-10 mg/hr), Intravenous, Titrated      Milrinone Lactate in Dextrose 20-5 MG/100ML-% infusion   0.25-0.75 mcg/kg/min (22.025-66.075 mcg/min), Intravenous, Titrated      ondansetron 4 MG/2ML injection  Commonly known as: ZOFRAN   4 mg, Intravenous, Every 6 Hours PRN      pantoprazole 40 MG injection  Commonly known as: PROTONIX   40 mg, Intravenous,  Every Morning Before Breakfast      phenylephrine 200 MCG/ML solution  Commonly known as: MICK-SYNEPHRINE   0.5-3 mcg/kg/min (44..3 mcg/min), Intravenous, Titrated      propofol 10 mg/mL emulsion infusion  Commonly known as: DIPRIVAN   5-50 mcg/kg/min (440.5-4,405 mcg/min), Intravenous, Titrated      sodium chloride 0.9 % solution   100 mL/hr (100 mL/hr), Intravenous, Continuous             Discharge Diet: NPO    Activity at Discharge: intubated / sedated    Follow-up Appointments  No future appointments.         Papa Jimenez MD  11/05/20  15:18 CST          Electronically signed by Papa Jimenez MD at 11/05/20 1520       Discharge Order (From admission, onward)     Start     Ordered    11/05/20 1459  Discharge patient  Once     Expected Discharge Date: 11/05/20    Discharge Disposition: [facility] Hospital ([place] - [place])    Physician of Record for Attribution - Please select from Treatment Team: MISAEL VICTORIA [[id number]]    Review needed by CMO to determine Physician of Record: No       Question Answer Comment   Physician of Record for Attribution - Please select from Treatment Team MISAEL VICTORIA    Review needed by CMO to determine Physician of Record No        11/05/20 7530

## 2020-11-15 LAB
QT INTERVAL: 338 MS
QTC INTERVAL: 465 MS

## (undated) DEVICE — PK ANGIO LF 60

## (undated) DEVICE — ARTERIAL NEEDLE: Brand: UNBRANDED

## (undated) DEVICE — PK CATH LAB 60

## (undated) DEVICE — THE PRONTO CATHETER IS INDICATED FOR THE REMOVAL OF FRESH, SOFT EMBOLI AND THROMBI FROM VESSELS IN THE CORONARY AND PERIPHERAL VASCULATURE.: Brand: PRONTO® V4 EXTRACTION CATHETER

## (undated) DEVICE — KT INTRO MINISTICK MAX W/GW NITNL/TUNG ECHO 4F 21G 7CM

## (undated) DEVICE — CATH GUIDE LAUNCHER JL4.0 6F 100CM

## (undated) DEVICE — ELECTRODE,RT,STRESS,FOAM,50PK: Brand: MEDLINE

## (undated) DEVICE — MINI TREK CORONARY DILATATION CATHETER 2.0 MM X 20 MM / RAPID-EXCHANGE: Brand: MINI TREK

## (undated) DEVICE — MODEL BT2000 P/N 700287-012KIT CONTENTS: MANIFOLD WITH SALINE AND CONTRAST PORTS, SALINE TUBING WITH SPIKE AND HAND SYRINGE, TRANSDUCER: Brand: BT2000 AUTOMATED MANIFOLD KIT

## (undated) DEVICE — BALN IAB SENSATION PLS F/O 40CC 7.5F 15CM SYS

## (undated) DEVICE — LACERATION TRAY W/NEEDLE HLDR: Brand: MEDLINE INDUSTRIES, INC.

## (undated) DEVICE — BAND BAG 36" X 25": Brand: STERIMED

## (undated) DEVICE — GW PERIPH GUIDERIGHT STD/J/TP PTFE/PCOAT SS 0.038IN 5X150CM

## (undated) DEVICE — TREK CORONARY DILATATION CATHETER 2.50 MM X 20 MM / RAPID-EXCHANGE: Brand: TREK

## (undated) DEVICE — 6F .070 XB LAD 3.5 100CM: Brand: VISTA BRITE TIP

## (undated) DEVICE — SWAN-GANZ THERMODILUTION CATHETER: Brand: SWAN-GANZ

## (undated) DEVICE — MODEL AT P65, P/N 701554-001KIT CONTENTS: HAND CONTROLLER, 3-WAY HIGH-PRESSURE STOPCOCK WITH ROTATING END AND PREMIUM HIGH-PRESSURE TUBING: Brand: ANGIOTOUCH® KIT

## (undated) DEVICE — CATH DIAG EXPO M/ PK 6FR FL4/FR4 PIG 3PK

## (undated) DEVICE — Device

## (undated) DEVICE — DISPOSABLE ADAPTER

## (undated) DEVICE — GW CHOICE PT J 300CM

## (undated) DEVICE — INTRO SHEATH ART/FEM ENGAGE .038 6F12CM

## (undated) DEVICE — INTRO SHEATH ENGAGE W/50 GW .038 8F12

## (undated) DEVICE — CATH DIAG EXPO .052 PIG 6F 110CM

## (undated) DEVICE — ST CVR PROB PULLUP ULTRASND 5X48IN

## (undated) DEVICE — CATH PACE PACEL BIOPOLAR HEART CRV 5F 110CM RT

## (undated) DEVICE — SLV REPOSTNG CATH STRL 60CM

## (undated) DEVICE — COPILOT KIT INCLUDES BLEEDBACK CONTROL VALVE / GUIDE WIRE INTRODUCER / TORQUE DEVICE: Brand: ACCESSORIES

## (undated) DEVICE — KT CATH CAD SUP IMPELLA/CP 9/14F 5L

## (undated) DEVICE — A2000 MULTI-USE SYRINGE KIT, P/N 701277-003KIT CONTENTS: 100ML CONTRAST RESERVOIR AND TUBING WITH CONTRAST SPIKE AND CLAMP: Brand: A2000 MULTI-USE SYRINGE KIT

## (undated) DEVICE — DEV INFL MONARCH 20ML